# Patient Record
Sex: FEMALE | Race: WHITE | Employment: UNEMPLOYED | URBAN - METROPOLITAN AREA
[De-identification: names, ages, dates, MRNs, and addresses within clinical notes are randomized per-mention and may not be internally consistent; named-entity substitution may affect disease eponyms.]

---

## 2019-11-12 PROBLEM — R10.2 PELVIC PAIN: Status: ACTIVE | Noted: 2019-11-12

## 2019-11-12 PROBLEM — Z87.42 HISTORY OF ENDOMETRIOSIS: Status: ACTIVE | Noted: 2019-11-12

## 2019-11-12 PROBLEM — K57.30 SIGMOID DIVERTICULOSIS: Status: ACTIVE | Noted: 2019-11-12

## 2019-11-18 PROBLEM — F11.11 HISTORY OF OPIOID ABUSE (HCC): Status: ACTIVE | Noted: 2019-11-18

## 2019-11-18 PROBLEM — F43.10 PTSD (POST-TRAUMATIC STRESS DISORDER): Status: ACTIVE | Noted: 2019-11-18

## 2019-11-18 PROBLEM — F41.9 ANXIETY: Status: ACTIVE | Noted: 2019-11-18

## 2019-11-18 PROBLEM — N93.9 ABNORMAL UTERINE BLEEDING: Status: ACTIVE | Noted: 2019-11-18

## 2019-11-18 PROBLEM — R87.619 ATYPICAL GLANDULAR CELLS OF UNDETERMINED SIGNIFICANCE (AGUS) ON CERVICAL PAP SMEAR: Status: ACTIVE | Noted: 2019-11-18

## 2019-11-18 PROBLEM — N88.2 CERVICAL STENOSIS (UTERINE CERVIX): Status: ACTIVE | Noted: 2019-11-18

## 2019-12-11 ENCOUNTER — HOSPITAL ENCOUNTER (OUTPATIENT)
Dept: SURGERY | Age: 39
Discharge: HOME | End: 2019-12-11

## 2019-12-11 VITALS — HEIGHT: 66 IN | WEIGHT: 105 LBS | BODY MASS INDEX: 16.88 KG/M2

## 2019-12-11 RX ORDER — PANTOPRAZOLE SODIUM 40 MG/1
40 TABLET, DELAYED RELEASE ORAL DAILY
COMMUNITY

## 2019-12-11 RX ORDER — ONDANSETRON 4 MG/1
4 TABLET, FILM COATED ORAL
COMMUNITY

## 2019-12-11 NOTE — PERIOP NOTES
Patient verified name and . Order for consent NOT found in EHR, unable to confirm case posting; patient verifies procedure. Type 2 surgery, PAT phone assessment complete. Orders NOT received. Labs per surgeon: No orders received. Labs per anesthesia protocol: Hgb DOS; order signed and held in EHR. Patient reports she is unable to come to the hospital prior to surgery to have lab worked; patient does not have a vehicle and mother works. Patient answered medical/surgical history questions at their best of ability. All prior to admission medications documented in [facility]. Patient instructed to take the following medications the day of surgery according to anesthesia guidelines with a small sip of water: Klonopin and Protonix. Hold all vitamins, supplements, herbals (including CBD oil and Marijuana) 7 days prior to surgery and NSAIDS 5 days prior to surgery. Patient instructed to stop Marijuana prior to surgery patient states \"It is better than any other fuc#### drug, pardon my language. \" Patient instructed importance of not smoking marijuana prior to surgery per anesthesia protocol, patient states \"I've smoked before I've gone under before with no problems, but whatever, I'll listen to the doctors. \"       Patient instructed on the following:  Arrive at [address], time of arrival to be called the day before by 1700  NPO after midnight including gum, mints, and ice chips  Responsible adult must drive patient to the hospital, stay during surgery, and patient will need supervision 24 hours after anesthesia  Use anti-bacterial soap in shower the night before surgery and on the morning of surgery  All piercings must be removed prior to arrival.    Leave all valuables (money and jewelry) at home but bring insurance card and ID on       DOS. Do not wear make-up, nail polish, lotions, cologne, perfumes, powders, or oil on skin.     Patient teach back successful and patient demonstrates knowledge of instruction.

## 2019-12-14 ENCOUNTER — ANESTHESIA EVENT (OUTPATIENT)
Dept: SURGERY | Age: 39
End: 2019-12-14
Payer: MEDICAID

## 2019-12-16 ENCOUNTER — HOSPITAL ENCOUNTER (OUTPATIENT)
Age: 39
Setting detail: OUTPATIENT SURGERY
Discharge: HOME | End: 2019-12-16
Attending: OBSTETRICS & GYNECOLOGY | Admitting: OBSTETRICS & GYNECOLOGY
Payer: MEDICAID

## 2019-12-16 ENCOUNTER — ANESTHESIA (OUTPATIENT)
Dept: SURGERY | Age: 39
End: 2019-12-16
Payer: MEDICAID

## 2019-12-16 VITALS
HEIGHT: 66 IN | SYSTOLIC BLOOD PRESSURE: 109 MMHG | WEIGHT: 103 LBS | OXYGEN SATURATION: 96 % | DIASTOLIC BLOOD PRESSURE: 62 MMHG | TEMPERATURE: 97.5 F | BODY MASS INDEX: 16.55 KG/M2 | HEART RATE: 48 BPM | RESPIRATION RATE: 12 BRPM

## 2019-12-16 DIAGNOSIS — G89.18 POST-OP PAIN: Primary | ICD-10-CM

## 2019-12-16 PROBLEM — N73.6 PELVIC ADHESIONS: Status: ACTIVE | Noted: 2019-12-16

## 2019-12-16 LAB
HCG UR QL: NEGATIVE
HGB BLD-MCNC: 12.5 G/DL (ref 11.7–15.4)

## 2019-12-16 PROCEDURE — 77030031139 HC SUT VCRL2 J&J -A: Performed by: OBSTETRICS & GYNECOLOGY

## 2019-12-16 PROCEDURE — 76210000026 HC REC RM PH II 1 TO 1.5 HR: Performed by: OBSTETRICS & GYNECOLOGY

## 2019-12-16 PROCEDURE — 74011250636 HC RX REV CODE- 250/636: Performed by: NURSE ANESTHETIST, CERTIFIED REGISTERED

## 2019-12-16 PROCEDURE — 74011250636 HC RX REV CODE- 250/636: Performed by: ANESTHESIOLOGY

## 2019-12-16 PROCEDURE — 74011000250 HC RX REV CODE- 250: Performed by: NURSE ANESTHETIST, CERTIFIED REGISTERED

## 2019-12-16 PROCEDURE — 77030039425 HC BLD LARYNG TRULITE DISP TELE -A: Performed by: NURSE ANESTHETIST, CERTIFIED REGISTERED

## 2019-12-16 PROCEDURE — 77030020829: Performed by: OBSTETRICS & GYNECOLOGY

## 2019-12-16 PROCEDURE — 76060000034 HC ANESTHESIA 1.5 TO 2 HR: Performed by: OBSTETRICS & GYNECOLOGY

## 2019-12-16 PROCEDURE — 88305 TISSUE EXAM BY PATHOLOGIST: CPT

## 2019-12-16 PROCEDURE — 76010000161 HC OR TIME 1 TO 1.5 HR INTENSV-TIER 1: Performed by: OBSTETRICS & GYNECOLOGY

## 2019-12-16 PROCEDURE — 81025 URINE PREGNANCY TEST: CPT

## 2019-12-16 PROCEDURE — 77030040922 HC BLNKT HYPOTHRM STRY -A: Performed by: NURSE ANESTHETIST, CERTIFIED REGISTERED

## 2019-12-16 PROCEDURE — 85018 HEMOGLOBIN: CPT

## 2019-12-16 PROCEDURE — 74011250637 HC RX REV CODE- 250/637: Performed by: OBSTETRICS & GYNECOLOGY

## 2019-12-16 PROCEDURE — 74011250637 HC RX REV CODE- 250/637: Performed by: ANESTHESIOLOGY

## 2019-12-16 PROCEDURE — 77030019927 HC TBNG IRR CYSTO BAXT -A: Performed by: OBSTETRICS & GYNECOLOGY

## 2019-12-16 PROCEDURE — 76210000006 HC OR PH I REC 0.5 TO 1 HR: Performed by: OBSTETRICS & GYNECOLOGY

## 2019-12-16 PROCEDURE — 77030012770 HC TRCR OPT FX AMR -B: Performed by: OBSTETRICS & GYNECOLOGY

## 2019-12-16 PROCEDURE — 77030040361 HC SLV COMPR DVT MDII -B: Performed by: OBSTETRICS & GYNECOLOGY

## 2019-12-16 PROCEDURE — 77030018836 HC SOL IRR NACL ICUM -A: Performed by: OBSTETRICS & GYNECOLOGY

## 2019-12-16 PROCEDURE — 77030003578 HC NDL INSUF VERES AMR -B: Performed by: OBSTETRICS & GYNECOLOGY

## 2019-12-16 PROCEDURE — 77030037088 HC TUBE ENDOTRACH ORAL NSL COVD-A: Performed by: NURSE ANESTHETIST, CERTIFIED REGISTERED

## 2019-12-16 PROCEDURE — 77030008606 HC TRCR ENDOSC KII AMR -B: Performed by: OBSTETRICS & GYNECOLOGY

## 2019-12-16 PROCEDURE — 77030040830 HC CATH URETH FOL MDII -A: Performed by: OBSTETRICS & GYNECOLOGY

## 2019-12-16 PROCEDURE — 77030034696 HC CATH URETH FOL 2W BARD -A: Performed by: OBSTETRICS & GYNECOLOGY

## 2019-12-16 PROCEDURE — 77030018825 HC SOL ADH REDUC BAXT -C: Performed by: OBSTETRICS & GYNECOLOGY

## 2019-12-16 PROCEDURE — 77030008522 HC TBNG INSUF LAPRO STRY -B: Performed by: OBSTETRICS & GYNECOLOGY

## 2019-12-16 PROCEDURE — 77030000038 HC TIP SCIS LAPSCP SURI -B: Performed by: OBSTETRICS & GYNECOLOGY

## 2019-12-16 RX ORDER — DEXAMETHASONE SODIUM PHOSPHATE 4 MG/ML
INJECTION, SOLUTION INTRA-ARTICULAR; INTRALESIONAL; INTRAMUSCULAR; INTRAVENOUS; SOFT TISSUE AS NEEDED
Status: DISCONTINUED | OUTPATIENT
Start: 2019-12-16 | End: 2019-12-16 | Stop reason: HOSPADM

## 2019-12-16 RX ORDER — NEOSTIGMINE METHYLSULFATE 1 MG/ML
INJECTION, SOLUTION INTRAVENOUS AS NEEDED
Status: DISCONTINUED | OUTPATIENT
Start: 2019-12-16 | End: 2019-12-16 | Stop reason: HOSPADM

## 2019-12-16 RX ORDER — SODIUM CHLORIDE, SODIUM LACTATE, POTASSIUM CHLORIDE, CALCIUM CHLORIDE 600; 310; 30; 20 MG/100ML; MG/100ML; MG/100ML; MG/100ML
75 INJECTION, SOLUTION INTRAVENOUS CONTINUOUS
Status: DISCONTINUED | OUTPATIENT
Start: 2019-12-16 | End: 2019-12-16 | Stop reason: HOSPADM

## 2019-12-16 RX ORDER — LIDOCAINE HYDROCHLORIDE 10 MG/ML
0.1 INJECTION INFILTRATION; PERINEURAL AS NEEDED
Status: DISCONTINUED | OUTPATIENT
Start: 2019-12-16 | End: 2019-12-16 | Stop reason: HOSPADM

## 2019-12-16 RX ORDER — SODIUM CHLORIDE 0.9 % (FLUSH) 0.9 %
5-40 SYRINGE (ML) INJECTION EVERY 8 HOURS
Status: DISCONTINUED | OUTPATIENT
Start: 2019-12-16 | End: 2019-12-16 | Stop reason: HOSPADM

## 2019-12-16 RX ORDER — HYDROMORPHONE HYDROCHLORIDE 2 MG/ML
0.5 INJECTION, SOLUTION INTRAMUSCULAR; INTRAVENOUS; SUBCUTANEOUS
Status: DISCONTINUED | OUTPATIENT
Start: 2019-12-16 | End: 2019-12-16 | Stop reason: HOSPADM

## 2019-12-16 RX ORDER — GABAPENTIN 300 MG/1
300 CAPSULE ORAL ONCE
Status: COMPLETED | OUTPATIENT
Start: 2019-12-16 | End: 2019-12-16

## 2019-12-16 RX ORDER — PROPOFOL 10 MG/ML
INJECTION, EMULSION INTRAVENOUS AS NEEDED
Status: DISCONTINUED | OUTPATIENT
Start: 2019-12-16 | End: 2019-12-16 | Stop reason: HOSPADM

## 2019-12-16 RX ORDER — ROCURONIUM BROMIDE 10 MG/ML
INJECTION, SOLUTION INTRAVENOUS AS NEEDED
Status: DISCONTINUED | OUTPATIENT
Start: 2019-12-16 | End: 2019-12-16 | Stop reason: HOSPADM

## 2019-12-16 RX ORDER — FENTANYL CITRATE 50 UG/ML
INJECTION, SOLUTION INTRAMUSCULAR; INTRAVENOUS AS NEEDED
Status: DISCONTINUED | OUTPATIENT
Start: 2019-12-16 | End: 2019-12-16 | Stop reason: HOSPADM

## 2019-12-16 RX ORDER — LIDOCAINE HYDROCHLORIDE 20 MG/ML
INJECTION, SOLUTION EPIDURAL; INFILTRATION; INTRACAUDAL; PERINEURAL AS NEEDED
Status: DISCONTINUED | OUTPATIENT
Start: 2019-12-16 | End: 2019-12-16 | Stop reason: HOSPADM

## 2019-12-16 RX ORDER — GLYCOPYRROLATE 0.2 MG/ML
INJECTION INTRAMUSCULAR; INTRAVENOUS AS NEEDED
Status: DISCONTINUED | OUTPATIENT
Start: 2019-12-16 | End: 2019-12-16 | Stop reason: HOSPADM

## 2019-12-16 RX ORDER — KETOROLAC TROMETHAMINE 10 MG/1
10 TABLET, FILM COATED ORAL
Qty: 20 TAB | Refills: 0 | Status: SHIPPED | OUTPATIENT
Start: 2019-12-16 | End: 2019-12-21

## 2019-12-16 RX ORDER — DIPHENHYDRAMINE HYDROCHLORIDE 50 MG/ML
12.5 INJECTION, SOLUTION INTRAMUSCULAR; INTRAVENOUS
Status: DISCONTINUED | OUTPATIENT
Start: 2019-12-16 | End: 2019-12-16 | Stop reason: HOSPADM

## 2019-12-16 RX ORDER — OXYCODONE HYDROCHLORIDE 5 MG/1
10 TABLET ORAL
Status: COMPLETED | OUTPATIENT
Start: 2019-12-16 | End: 2019-12-16

## 2019-12-16 RX ORDER — OXYCODONE HYDROCHLORIDE 5 MG/1
5 TABLET ORAL
Status: DISCONTINUED | OUTPATIENT
Start: 2019-12-16 | End: 2019-12-16 | Stop reason: HOSPADM

## 2019-12-16 RX ORDER — MIDAZOLAM HYDROCHLORIDE 1 MG/ML
2 INJECTION, SOLUTION INTRAMUSCULAR; INTRAVENOUS
Status: COMPLETED | OUTPATIENT
Start: 2019-12-16 | End: 2019-12-16

## 2019-12-16 RX ORDER — EPHEDRINE SULFATE/0.9% NACL/PF 50 MG/5 ML
SYRINGE (ML) INTRAVENOUS AS NEEDED
Status: DISCONTINUED | OUTPATIENT
Start: 2019-12-16 | End: 2019-12-16 | Stop reason: HOSPADM

## 2019-12-16 RX ORDER — ONDANSETRON 2 MG/ML
INJECTION INTRAMUSCULAR; INTRAVENOUS AS NEEDED
Status: DISCONTINUED | OUTPATIENT
Start: 2019-12-16 | End: 2019-12-16 | Stop reason: HOSPADM

## 2019-12-16 RX ORDER — SODIUM CHLORIDE 0.9 % (FLUSH) 0.9 %
5-40 SYRINGE (ML) INJECTION AS NEEDED
Status: DISCONTINUED | OUTPATIENT
Start: 2019-12-16 | End: 2019-12-16 | Stop reason: HOSPADM

## 2019-12-16 RX ORDER — NALOXONE HYDROCHLORIDE 0.4 MG/ML
0.1 INJECTION, SOLUTION INTRAMUSCULAR; INTRAVENOUS; SUBCUTANEOUS
Status: DISCONTINUED | OUTPATIENT
Start: 2019-12-16 | End: 2019-12-16 | Stop reason: HOSPADM

## 2019-12-16 RX ORDER — FLUMAZENIL 0.1 MG/ML
0.2 INJECTION INTRAVENOUS AS NEEDED
Status: DISCONTINUED | OUTPATIENT
Start: 2019-12-16 | End: 2019-12-16 | Stop reason: HOSPADM

## 2019-12-16 RX ADMIN — PROPOFOL 150 MG: 10 INJECTION, EMULSION INTRAVENOUS at 11:50

## 2019-12-16 RX ADMIN — Medication 3 MG: at 12:58

## 2019-12-16 RX ADMIN — GABAPENTIN 300 MG: 300 CAPSULE ORAL at 09:36

## 2019-12-16 RX ADMIN — FENTANYL CITRATE 100 MCG: 50 INJECTION INTRAMUSCULAR; INTRAVENOUS at 11:50

## 2019-12-16 RX ADMIN — DEXAMETHASONE SODIUM PHOSPHATE 4 MG: 4 INJECTION, SOLUTION INTRAMUSCULAR; INTRAVENOUS at 12:03

## 2019-12-16 RX ADMIN — SODIUM CHLORIDE, SODIUM LACTATE, POTASSIUM CHLORIDE, AND CALCIUM CHLORIDE 75 ML/HR: 600; 310; 30; 20 INJECTION, SOLUTION INTRAVENOUS at 09:20

## 2019-12-16 RX ADMIN — GLYCOPYRROLATE 0.4 MG: 0.2 INJECTION, SOLUTION INTRAMUSCULAR; INTRAVENOUS at 12:58

## 2019-12-16 RX ADMIN — HYDROMORPHONE HYDROCHLORIDE 0.5 MG: 2 INJECTION INTRAMUSCULAR; INTRAVENOUS; SUBCUTANEOUS at 13:36

## 2019-12-16 RX ADMIN — ROCURONIUM BROMIDE 20 MG: 10 INJECTION, SOLUTION INTRAVENOUS at 11:50

## 2019-12-16 RX ADMIN — Medication 20 MG: at 11:59

## 2019-12-16 RX ADMIN — LIDOCAINE HYDROCHLORIDE 50 MG: 20 INJECTION, SOLUTION EPIDURAL; INFILTRATION; INTRACAUDAL; PERINEURAL at 11:50

## 2019-12-16 RX ADMIN — MIDAZOLAM 2 MG: 1 INJECTION INTRAMUSCULAR; INTRAVENOUS at 09:37

## 2019-12-16 RX ADMIN — HYDROMORPHONE HYDROCHLORIDE 0.5 MG: 2 INJECTION INTRAMUSCULAR; INTRAVENOUS; SUBCUTANEOUS at 13:46

## 2019-12-16 RX ADMIN — ROCURONIUM BROMIDE 10 MG: 10 INJECTION, SOLUTION INTRAVENOUS at 12:07

## 2019-12-16 RX ADMIN — ONDANSETRON 4 MG: 2 INJECTION INTRAMUSCULAR; INTRAVENOUS at 12:03

## 2019-12-16 RX ADMIN — Medication 1 MG: at 13:01

## 2019-12-16 RX ADMIN — SODIUM CHLORIDE, SODIUM LACTATE, POTASSIUM CHLORIDE, AND CALCIUM CHLORIDE: 600; 310; 30; 20 INJECTION, SOLUTION INTRAVENOUS at 12:47

## 2019-12-16 RX ADMIN — ROCURONIUM BROMIDE 10 MG: 10 INJECTION, SOLUTION INTRAVENOUS at 12:19

## 2019-12-16 RX ADMIN — OXYCODONE 10 MG: 5 TABLET ORAL at 14:20

## 2019-12-16 RX ADMIN — Medication 10 MG: at 11:56

## 2019-12-16 RX ADMIN — HYDROMORPHONE HYDROCHLORIDE 0.5 MG: 2 INJECTION INTRAMUSCULAR; INTRAVENOUS; SUBCUTANEOUS at 13:41

## 2019-12-16 RX ADMIN — GLYCOPYRROLATE 0.2 MG: 0.2 INJECTION, SOLUTION INTRAMUSCULAR; INTRAVENOUS at 13:01

## 2019-12-16 NOTE — DISCHARGE INSTRUCTIONS
Patient Education        Dilation and Curettage: What to Expect at Home  Your Recovery  Dilation and curettage (D&C) is a procedure to remove tissue from the inside of the uterus. The doctor used a curved tool, called a curette, to gently scrape tissue from your uterus. You are likely to have a backache, or cramps similar to menstrual cramps, and pass small clots of blood from your vagina for the first few days. You may continue to have light vaginal bleeding for several weeks after the procedure. You will probably be able to go back to most of your normal activities in 1 or 2 days. This care sheet gives you a general idea about how long it will take for you to recover. But each person recovers at a different pace. Follow the steps below to get better as quickly as possible. How can you care for yourself at home? Activity    · Rest when you feel tired. Getting enough sleep will help you recover.     · Avoid strenuous activities, such as bicycle riding, jogging, weight lifting, or aerobic exercise, until your doctor says it is okay.     · Most women are able to return to work the day after the procedure.     · You may have some light vaginal bleeding. Wear sanitary pads if needed. Do not douche or use tampons for 2 weeks or until your doctor says it is okay.     · Ask your doctor when it is okay for you to have sex.     · If you could become pregnant, talk about birth control with your doctor. Do not try to become pregnant until your doctor says it is okay. Diet    · You can eat your normal diet. If your stomach is upset, try bland, low-fat foods like plain rice, broiled chicken, toast, and yogurt.     · Drink plenty of fluids (unless your doctor tells you not to). Medicines    · Your doctor will tell you if and when you can restart your medicines.  He or she will also give you instructions about taking any new medicines.     · If you take blood thinners, such as warfarin (Coumadin), clopidogrel (Plavix), or aspirin, be sure to talk to your doctor. He or she will tell you if and when to start taking those medicines again. Make sure that you understand exactly what your doctor wants you to do.     · Be safe with medicines. Take pain medicines exactly as directed. ? If the doctor gave you a prescription medicine for pain, take it as prescribed. ? If you are not taking a prescription pain medicine, ask your doctor if you can take an over-the-counter medicine.     · If you think your pain medicine is making you sick to your stomach:  ? Take your medicine after meals (unless your doctor has told you not to). ? Ask your doctor for a different pain medicine.     · If your doctor prescribed antibiotics, take them as directed. Do not stop taking them just because you feel better. You need to take the full course of antibiotics. Follow-up care is a key part of your treatment and safety. Be sure to make and go to all appointments, and call your doctor if you are having problems. It's also a good idea to know your test results and keep a list of the medicines you take. When should you call for help? Call 911 anytime you think you may need emergency care. For example, call if:    · You passed out (lost consciousness).     · You have chest pain, are short of breath, or cough up blood.    Call your doctor now or seek immediate medical care if:    · You have bright red vaginal bleeding that soaks one or more pads in an hour, or you have large clots.     · You have vaginal discharge that increases in amount or smells bad.     · You are sick to your stomach or cannot drink fluids.     · You have pain that does not get better after you take pain medicine.     · You cannot pass stools or gas.     · You have symptoms of a blood clot in your leg (called a deep vein thrombosis), such as:  ? Pain in your calf, back of the knee, thigh, or groin. ?  Redness and swelling in your leg.     · You have signs of infection, such as:  ? Increased pain, swelling, warmth, or redness. ? Red streaks leading from the area. ? Pus draining from the area. ? A fever.    Watch closely for changes in your health, and be sure to contact your doctor if you have any problems. Where can you learn more? Go to http://jose miguel-roxy.info/. Enter [phone] in the search box to learn more about \"Dilation and Curettage: What to Expect at Home. \"  Current as of: May 29, 2019  Content Version: 12.2  © 6738-1095 [employer]. Care instructions adapted under license by [employer] (which disclaims liability or warranty for this information). If you have questions about a medical condition or this instruction, always ask your healthcare professional. [address] any warranty or liability for your use of this information.

## 2019-12-16 NOTE — ANESTHESIA POSTPROCEDURE EVALUATION
Procedure(s):  DILATATION AND CURETTAGE HYSTEROSCOPY  COLPOSCOPY  LAPAROSCOPY GYN /LASER. general    Anesthesia Post Evaluation      Multimodal analgesia: multimodal analgesia used between 6 hours prior to anesthesia start to PACU discharge  Patient location during evaluation: PACU  Patient participation: complete - patient participated  Level of consciousness: awake  Pain management: adequate  Airway patency: patent  Anesthetic complications: no  Cardiovascular status: acceptable and hemodynamically stable  Respiratory status: acceptable  Hydration status: acceptable  Comments: Acceptable for discharge from PACU.   Post anesthesia nausea and vomiting:  none      Vitals Value Taken Time   /58 12/16/2019  1:37 PM   Temp 36.6 °C (97.8 °F) 12/16/2019  1:17 PM   Pulse 44 12/16/2019  1:37 PM   Resp 12 12/16/2019  1:37 PM   SpO2 100 % 12/16/2019  1:37 PM

## 2019-12-16 NOTE — OP NOTES
LASER LAPARSCOPY/HYSTEROSCOPY/DILATATION AND CURRETAGE    DATE OF PROCEDURE: 12/16/2019    PREOPERATIVE DIAGNOSIS  Pelvic pain in female [R10.2]  History ofEndometriosis [N80.9] MEDHAT    POSTOPERATIVE DIAGNOSI MELISSA  Pelvic adhesions    PROCEDURE: Laser Laparoscopy/Hysteroscopy fractional   D&C/Coploscopy      SURGEON:  HAYLEE Morris: Kendy Gonzales CST    ANESTHESIA:  General Endotracheal Anesthesia    EBL:  Minimal    FINDINGS: Ovaries bilaterally adherent to pelvic sidewall. Left adnexa adherent to sigmoid colon and to the pericolic fat    SPECIMEN: ECC, EMC    PROCEDURE:  After thorough counseling and consent, the patient was taken to the operating room where she was placed in the supine position and underwent general endotracheal anesthesia without incident. She was placed in the low lithotomy position using the Javier stirrups. She was prepped with Betadine solution for laparoscopic and vaginal surgery and was draped in the usual sterile fashion. A Welch catheter was placed transurethrally and attached to a drainage bag. A speculum was placed in the vagina; the cervix was visualized and grasped with a single-tooth tennaculum. A Blood cannula uterine manipulator was placed transcervically and attached to the single tooth tennaculum. Attention was then turned to the abdomen an incision was made in the umbilicus and a verses needle was inserted in the abdomen; good placement was verified using the hanging drop technique. The abdomen was filled with 2 ½ liters of Co2. A 10mm trocar was placed in the abdomen, an operative laparoscope was placed through the trocar and the intra-abdominal contents were visualized. Above findings. For laparoscopic scissors and CO2 laser were used to restore normal anatomy on both sides of the pelvis. Ureters were bilaterally identified. 802,000 cc of adept was used to flood the pelvis after hemostasis was ensured.   The trocars were removed and the incisions were closed with 4-0 Vicryl stitch. .  Attention was then directed to the vaginal portion of the case. The cervix was exposed with a heavy weighted vaginal speculum, Blood cannula was removed from the cervix. Colposcopy was not satisfactory due to the cervical stenosis following previous LEEP procedures. The ectocervix was treated with acetic acid and appeared colposcopically normal.No cervical biopsies were obtained. A thorough endocervical curetting was obtained with the [facility] curette the uterus sounded to 7cm and dilated to [address]. Diagnostic Hysteroscope was introduced into the endometrial cavity using saline as a distention median. .  Contents of the uterine cavity were visualized. A thorough curettage of the endometrium was performed associated the use of a Darius-Stone Forceps. Reinspection of the uterine cavity with the Hysteroscope was accomplished without problems. The endometrial cavity appeared normal following the curettage. Both Endometrial and Endocervical curretings will be sent for histological review. The Hysteroscope was removed. The single-tooth tennaculum was removed from the cervix. Hemostasis was assured. The patient was awakened from [address] she tolerated the procedure well and was transferred to the recovery room in stable condition.

## 2019-12-16 NOTE — PERIOP NOTES
Discharge instructions completed 30 minutes ago. Pt's mother does not wish to get her dressed, yet. Pt to discharge area after report given to Rosina Munson RN.

## 2019-12-16 NOTE — ANESTHESIA PREPROCEDURE EVALUATION
Relevant Problems   No relevant active problems       Anesthetic History   No history of anesthetic complications            Review of Systems / Medical History  Patient summary reviewed and pertinent labs reviewed    Pulmonary          Smoker         Neuro/Psych         Psychiatric history    Comments: Regular THC use Cardiovascular  Within defined limits                Exercise tolerance: >4 METS     GI/Hepatic/Renal     GERD: well controlled           Endo/Other  Within defined limits           Other Findings              Physical Exam    Airway  Mallampati: I  TM Distance: 4 - 6 cm  Neck ROM: normal range of motion   Mouth opening: Normal     Cardiovascular    Rhythm: regular  Rate: normal         Dental    Dentition: Bridges     Pulmonary  Breath sounds clear to auscultation               Abdominal         Other Findings            Anesthetic Plan    ASA: 2  Anesthesia type: general            Anesthetic plan and risks discussed with: Patient and Mother

## 2019-12-30 PROBLEM — N83.8 DIMINISHED OVARIAN RESERVE: Status: ACTIVE | Noted: 2019-12-30

## 2020-02-10 ENCOUNTER — TRANSCRIPTION ENCOUNTER (OUTPATIENT)
Age: 40
End: 2020-02-10

## 2020-12-02 ENCOUNTER — APPOINTMENT (OUTPATIENT)
Dept: CARDIOLOGY | Facility: CLINIC | Age: 40
End: 2020-12-02
Payer: MEDICAID

## 2020-12-02 ENCOUNTER — NON-APPOINTMENT (OUTPATIENT)
Age: 40
End: 2020-12-02

## 2020-12-02 VITALS
TEMPERATURE: 97.8 F | BODY MASS INDEX: 20.09 KG/M2 | DIASTOLIC BLOOD PRESSURE: 64 MMHG | RESPIRATION RATE: 16 BRPM | SYSTOLIC BLOOD PRESSURE: 110 MMHG | HEIGHT: 66 IN | HEART RATE: 77 BPM | OXYGEN SATURATION: 99 % | WEIGHT: 125 LBS

## 2020-12-02 DIAGNOSIS — Z86.79 PERSONAL HISTORY OF OTHER DISEASES OF THE CIRCULATORY SYSTEM: ICD-10-CM

## 2020-12-02 DIAGNOSIS — Z83.3 FAMILY HISTORY OF DIABETES MELLITUS: ICD-10-CM

## 2020-12-02 DIAGNOSIS — F41.9 ANXIETY DISORDER, UNSPECIFIED: ICD-10-CM

## 2020-12-02 DIAGNOSIS — Z82.49 FAMILY HISTORY OF ISCHEMIC HEART DISEASE AND OTHER DISEASES OF THE CIRCULATORY SYSTEM: ICD-10-CM

## 2020-12-02 PROCEDURE — 99072 ADDL SUPL MATRL&STAF TM PHE: CPT

## 2020-12-02 PROCEDURE — 99204 OFFICE O/P NEW MOD 45 MIN: CPT

## 2020-12-02 RX ORDER — QUETIAPINE FUMARATE 100 MG/1
100 TABLET ORAL
Refills: 0 | Status: ACTIVE | COMMUNITY

## 2020-12-02 RX ORDER — CLONAZEPAM 1 MG/1
1 TABLET ORAL 3 TIMES DAILY
Refills: 0 | Status: ACTIVE | COMMUNITY

## 2020-12-02 NOTE — REASON FOR VISIT
[FreeTextEntry1] : Roma is a 40-year-old female with no past cardiovascular history.  She does have a strong family history of premature CAD several members on her father's side including her own father who had his first major cardiovascular event in his 50s.\par \par Patient is very athletic without any cardiac symptoms.  She denies palpitations, chest pain, shortness of breath, dizziness, syncope.\par \par She is trying to get pregnant being followed by GYN /fertility clinic\par \par Recently, her EKG showed abnormal T waves in aVL and nonspecific in V3.  She was then referred for further cardiovascular evaluation

## 2020-12-02 NOTE — PHYSICAL EXAM
[General Appearance - Well Developed] : well developed [Normal Appearance] : normal appearance [Well Groomed] : well groomed [General Appearance - Well Nourished] : well nourished [No Deformities] : no deformities [General Appearance - In No Acute Distress] : no acute distress [Normal Conjunctiva] : the conjunctiva exhibited no abnormalities [Eyelids - No Xanthelasma] : the eyelids demonstrated no xanthelasmas [Normal Oral Mucosa] : normal oral mucosa [No Oral Pallor] : no oral pallor [No Oral Cyanosis] : no oral cyanosis [Heart Rate And Rhythm] : heart rate and rhythm were normal [Heart Sounds] : normal S1 and S2 [Edema] : no peripheral edema present [Respiration, Rhythm And Depth] : normal respiratory rhythm and effort [Exaggerated Use Of Accessory Muscles For Inspiration] : no accessory muscle use [Auscultation Breath Sounds / Voice Sounds] : lungs were clear to auscultation bilaterally [Abdomen Soft] : soft [Abdomen Tenderness] : non-tender [Abnormal Walk] : normal gait [Gait - Sufficient For Exercise Testing] : the gait was sufficient for exercise testing [Nail Clubbing] : no clubbing of the fingernails [Cyanosis, Localized] : no localized cyanosis [Skin Color & Pigmentation] : normal skin color and pigmentation [Skin Turgor] : normal skin turgor [] : no rash [Oriented To Time, Place, And Person] : oriented to person, place, and time [Affect] : the affect was normal [Mood] : the mood was normal [No Anxiety] : not feeling anxious [FreeTextEntry1] : ?short systolic murmur

## 2020-12-02 NOTE — ASSESSMENT
[FreeTextEntry1] : Reviewed today:\par -EKG 11/24/2020: Sinus rhythm.  Nonspecific T waves in aVL and V3.\par -EKG 12-2-20: NS rhythm.  Mildly inverted T waves in V2.  Mostly unchanged compared to previous EKG

## 2020-12-02 NOTE — DISCUSSION/SUMMARY
[FreeTextEntry1] : Nonspecific T wave abnormality in a young woman with no previous cardiovascular history and asymptomatic for cardiac disease.\par \par She does have a strong family history of premature CAD.  We will also need preprocedure cardiac evaluation prior to receiving ova for infertility\par \par She does have history of systolic murmur.  Echocardiogram and ETT should be performed.  I also requested fasting lipid profile from her gynecologist\par \par Evaluation of early atherosclerosis with CT calcium score would be appropriate.  The patient is sure that she cannot be pregnant.  She understands that radiation is involved in obtaining CT calcium score which could be very harmful to possible pregnancy.  When then, I have suggested that she have urinary pregnancy test prior to her CT calcium score\par \par Follow-up after above tests.\par \par

## 2020-12-08 ENCOUNTER — NON-APPOINTMENT (OUTPATIENT)
Age: 40
End: 2020-12-08

## 2020-12-10 ENCOUNTER — NON-APPOINTMENT (OUTPATIENT)
Age: 40
End: 2020-12-10

## 2020-12-18 ENCOUNTER — APPOINTMENT (OUTPATIENT)
Dept: CARDIOLOGY | Facility: CLINIC | Age: 40
End: 2020-12-18

## 2020-12-21 ENCOUNTER — NON-APPOINTMENT (OUTPATIENT)
Age: 40
End: 2020-12-21

## 2021-01-05 ENCOUNTER — APPOINTMENT (OUTPATIENT)
Dept: CARDIOLOGY | Facility: CLINIC | Age: 41
End: 2021-01-05
Payer: MEDICAID

## 2021-01-05 PROCEDURE — 99072 ADDL SUPL MATRL&STAF TM PHE: CPT

## 2021-01-05 PROCEDURE — 93306 TTE W/DOPPLER COMPLETE: CPT

## 2021-01-13 ENCOUNTER — APPOINTMENT (OUTPATIENT)
Dept: CARDIOLOGY | Facility: CLINIC | Age: 41
End: 2021-01-13

## 2021-01-19 ENCOUNTER — APPOINTMENT (OUTPATIENT)
Dept: CARDIOLOGY | Facility: CLINIC | Age: 41
End: 2021-01-19
Payer: MEDICAID

## 2021-01-19 ENCOUNTER — APPOINTMENT (OUTPATIENT)
Dept: CARDIOLOGY | Facility: CLINIC | Age: 41
End: 2021-01-19

## 2021-01-19 DIAGNOSIS — R94.31 ABNORMAL ELECTROCARDIOGRAM [ECG] [EKG]: ICD-10-CM

## 2021-01-19 DIAGNOSIS — F32.9 MAJOR DEPRESSIVE DISORDER, SINGLE EPISODE, UNSPECIFIED: ICD-10-CM

## 2021-01-19 PROCEDURE — 99442: CPT

## 2022-05-12 ENCOUNTER — APPOINTMENT (OUTPATIENT)
Dept: DERMATOLOGY | Facility: CLINIC | Age: 42
End: 2022-05-12
Payer: MEDICAID

## 2022-05-12 DIAGNOSIS — L82.1 OTHER SEBORRHEIC KERATOSIS: ICD-10-CM

## 2022-05-12 DIAGNOSIS — D48.5 NEOPLASM OF UNCERTAIN BEHAVIOR OF SKIN: ICD-10-CM

## 2022-05-12 PROCEDURE — 11102 TANGNTL BX SKIN SINGLE LES: CPT

## 2022-05-12 PROCEDURE — 99202 OFFICE O/P NEW SF 15 MIN: CPT | Mod: 25

## 2022-05-12 RX ORDER — ACETAMINOPHEN 325 MG
TABLET ORAL
Refills: 0 | Status: ACTIVE | COMMUNITY

## 2022-05-12 RX ORDER — SERTRALINE HYDROCHLORIDE 100 MG/1
100 TABLET, FILM COATED ORAL
Refills: 0 | Status: ACTIVE | COMMUNITY

## 2022-05-12 RX ORDER — ERGOCALCIFEROL 1.25 MG/1
1.25 MG CAPSULE, LIQUID FILLED ORAL
Refills: 0 | Status: ACTIVE | COMMUNITY

## 2022-05-12 RX ORDER — PANTOPRAZOLE SODIUM 40 MG/1
40 GRANULE, DELAYED RELEASE ORAL
Refills: 0 | Status: ACTIVE | COMMUNITY

## 2022-05-12 RX ORDER — UBIDECARENONE 200 MG
500 CAPSULE ORAL
Refills: 0 | Status: ACTIVE | COMMUNITY

## 2022-05-12 RX ORDER — MAGNESIUM OXIDE/MAG AA CHELATE 300 MG
CAPSULE ORAL
Refills: 0 | Status: ACTIVE | COMMUNITY

## 2022-05-12 RX ORDER — ELASTIC BANDAGE 2"X2.2YD
BANDAGE TOPICAL
Refills: 0 | Status: ACTIVE | COMMUNITY

## 2022-05-12 RX ORDER — TRAZODONE HYDROCHLORIDE 50 MG/1
50 TABLET ORAL
Refills: 0 | Status: ACTIVE | COMMUNITY

## 2022-05-12 NOTE — PHYSICAL EXAM
[Alert] : alert [Oriented x 3] : ~L oriented x 3 [Well Nourished] : well nourished [FreeTextEntry3] : The following areas were examined and no significant abnormalities were seen except as noted below:\par \par Type II skin\par \par scalp, face, eyelids, nose, lips, ears, neck, chest, abdomen, back, buttocks, right arm, left arm, right hand, left hand,\par right  leg, left leg, right foot, left foot\par Breast and groin exams offered and declined by patient.\par \par Mid inner bowl of left ear:  Circular depressed scar, focally white-especially [place]\par Right distal forearm: 4 x 3.5 mm pink papule\par Left mid forearm:  11 x 8 mm brown patch-darker and focally raised [place]-not suspicious on dermsocopy\par Left calf: 4 x 4 mm tan tan scaly slightly elevated papule and a tattoo\par Right lateral leg, 3 x 2 mm slightly eroded papule\par Left lower medial leg: 7 x 4 mm crusted pink plaque\par Right Achilles area noted by 10 mm thick brown verrucous plaque\par \par No other suspicious lesions seen

## 2022-05-12 NOTE — CONSULT LETTER
[Dear  ___] : Dear  [unfilled], [Consult Letter:] : I had the pleasure of evaluating your patient, [unfilled]. [Consult Closing:] : Thank you very much for allowing me to participate in the care of this patient.  If you have any questions, please do not hesitate to contact me. [Sincerely,] : Sincerely, [FreeTextEntry2] : Sukumar Evans, DO [FreeTextEntry1] : Examination of the skin reveals a 7 x 4 mm crusted erythematous plaque on the left lower medial leg. While this is probably a traumatized dermatofibroma, a biopsy was taken to rule out a potential basal cell carcinoma.\par \par The rest of her skin is unremarkable with scattered small seborrheic keratoses and warts, especially on the forearms and legs.\par The lesion on the left forearm is a benign lentigo with an early evolving seborrheic keratosis\par \par \par Please see attached chart note for further details. [FreeTextEntry3] : Kenroy Londono MD\par [address], [address]\par [place] [address]\par Tel ([phone])\par Fax ([phone])\par Private line ([phone])\par

## 2022-05-12 NOTE — HISTORY OF PRESENT ILLNESS
[FreeTextEntry1] : Evaluation of growths [de-identified] : First visit for 41-year-old white female referred by Sukumar Evans DO, for evaluation of growths.  Particular concerned about a lesions on the left forearm and left leg.  No history of skin cancer.

## 2022-05-12 NOTE — ASSESSMENT
[FreeTextEntry1] : Lentigo evolving into a seborrheic keratosis on left mid forearm\par Scattered early seborrheic keratoses on arms and legs\par Probable wart on right Achilles area\par ? Traumatized dermatofibroma on left lower medial leg-rule out BCC

## 2022-06-03 ENCOUNTER — NON-APPOINTMENT (OUTPATIENT)
Age: 42
End: 2022-06-03

## 2023-05-16 ENCOUNTER — INPATIENT (INPATIENT)
Facility: HOSPITAL | Age: 43
LOS: 6 days | Discharge: HOME | End: 2023-05-23
Attending: PSYCHIATRY & NEUROLOGY | Admitting: PSYCHIATRY & NEUROLOGY
Payer: MEDICAID

## 2023-05-16 VITALS
SYSTOLIC BLOOD PRESSURE: 126 MMHG | HEART RATE: 86 BPM | OXYGEN SATURATION: 100 % | TEMPERATURE: 98 F | RESPIRATION RATE: 18 BRPM | DIASTOLIC BLOOD PRESSURE: 87 MMHG

## 2023-05-16 DIAGNOSIS — Z90.79 ACQUIRED ABSENCE OF OTHER GENITAL ORGAN(S): Chronic | ICD-10-CM

## 2023-05-16 DIAGNOSIS — F32.9 MAJOR DEPRESSIVE DISORDER, SINGLE EPISODE, UNSPECIFIED: ICD-10-CM

## 2023-05-16 DIAGNOSIS — F12.20 CANNABIS DEPENDENCE, UNCOMPLICATED: ICD-10-CM

## 2023-05-16 DIAGNOSIS — F33.2 MAJOR DEPRESSIVE DISORDER, RECURRENT SEVERE WITHOUT PSYCHOTIC FEATURES: ICD-10-CM

## 2023-05-16 DIAGNOSIS — Z98.890 OTHER SPECIFIED POSTPROCEDURAL STATES: Chronic | ICD-10-CM

## 2023-05-16 DIAGNOSIS — F43.10 POST-TRAUMATIC STRESS DISORDER, UNSPECIFIED: ICD-10-CM

## 2023-05-16 LAB
ALBUMIN SERPL ELPH-MCNC: 4.4 G/DL — SIGNIFICANT CHANGE UP (ref 3.3–5)
ALP SERPL-CCNC: 70 U/L — SIGNIFICANT CHANGE UP (ref 40–120)
ALT FLD-CCNC: 7 U/L — SIGNIFICANT CHANGE UP (ref 4–33)
AMPHET UR-MCNC: NEGATIVE — SIGNIFICANT CHANGE UP
ANION GAP SERPL CALC-SCNC: 12 MMOL/L — SIGNIFICANT CHANGE UP (ref 7–14)
APAP SERPL-MCNC: <10 UG/ML — LOW (ref 15–25)
APPEARANCE UR: ABNORMAL
AST SERPL-CCNC: 13 U/L — SIGNIFICANT CHANGE UP (ref 4–32)
BACTERIA # UR AUTO: NEGATIVE — SIGNIFICANT CHANGE UP
BARBITURATES UR SCN-MCNC: NEGATIVE — SIGNIFICANT CHANGE UP
BASOPHILS # BLD AUTO: 0.06 K/UL — SIGNIFICANT CHANGE UP (ref 0–0.2)
BASOPHILS NFR BLD AUTO: 0.9 % — SIGNIFICANT CHANGE UP (ref 0–2)
BENZODIAZ UR-MCNC: POSITIVE
BILIRUB SERPL-MCNC: 0.2 MG/DL — SIGNIFICANT CHANGE UP (ref 0.2–1.2)
BILIRUB UR-MCNC: NEGATIVE — SIGNIFICANT CHANGE UP
BUN SERPL-MCNC: 15 MG/DL — SIGNIFICANT CHANGE UP (ref 7–23)
CALCIUM SERPL-MCNC: 9.1 MG/DL — SIGNIFICANT CHANGE UP (ref 8.4–10.5)
CHLORIDE SERPL-SCNC: 103 MMOL/L — SIGNIFICANT CHANGE UP (ref 98–107)
CO2 SERPL-SCNC: 26 MMOL/L — SIGNIFICANT CHANGE UP (ref 22–31)
COCAINE METAB.OTHER UR-MCNC: NEGATIVE — SIGNIFICANT CHANGE UP
COLOR SPEC: YELLOW — SIGNIFICANT CHANGE UP
CREAT SERPL-MCNC: 0.82 MG/DL — SIGNIFICANT CHANGE UP (ref 0.5–1.3)
CREATININE URINE RESULT, DAU: 115 MG/DL — SIGNIFICANT CHANGE UP
DIFF PNL FLD: NEGATIVE — SIGNIFICANT CHANGE UP
EGFR: 91 ML/MIN/1.73M2 — SIGNIFICANT CHANGE UP
EOSINOPHIL # BLD AUTO: 0.24 K/UL — SIGNIFICANT CHANGE UP (ref 0–0.5)
EOSINOPHIL NFR BLD AUTO: 3.7 % — SIGNIFICANT CHANGE UP (ref 0–6)
EPI CELLS # UR: 4 /HPF — SIGNIFICANT CHANGE UP (ref 0–5)
ETHANOL SERPL-MCNC: <10 MG/DL — SIGNIFICANT CHANGE UP
GLUCOSE SERPL-MCNC: 97 MG/DL — SIGNIFICANT CHANGE UP (ref 70–99)
GLUCOSE UR QL: NEGATIVE — SIGNIFICANT CHANGE UP
HCG SERPL-ACNC: <1 MIU/ML — SIGNIFICANT CHANGE UP
HCT VFR BLD CALC: 37.8 % — SIGNIFICANT CHANGE UP (ref 34.5–45)
HGB BLD-MCNC: 12.3 G/DL — SIGNIFICANT CHANGE UP (ref 11.5–15.5)
IANC: 4.33 K/UL — SIGNIFICANT CHANGE UP (ref 1.8–7.4)
IMM GRANULOCYTES NFR BLD AUTO: 0.3 % — SIGNIFICANT CHANGE UP (ref 0–0.9)
KETONES UR-MCNC: NEGATIVE — SIGNIFICANT CHANGE UP
LEUKOCYTE ESTERASE UR-ACNC: NEGATIVE — SIGNIFICANT CHANGE UP
LYMPHOCYTES # BLD AUTO: 1.44 K/UL — SIGNIFICANT CHANGE UP (ref 1–3.3)
LYMPHOCYTES # BLD AUTO: 22 % — SIGNIFICANT CHANGE UP (ref 13–44)
MCHC RBC-ENTMCNC: 30.2 PG — SIGNIFICANT CHANGE UP (ref 27–34)
MCHC RBC-ENTMCNC: 32.5 GM/DL — SIGNIFICANT CHANGE UP (ref 32–36)
MCV RBC AUTO: 92.9 FL — SIGNIFICANT CHANGE UP (ref 80–100)
METHADONE UR-MCNC: NEGATIVE — SIGNIFICANT CHANGE UP
MONOCYTES # BLD AUTO: 0.47 K/UL — SIGNIFICANT CHANGE UP (ref 0–0.9)
MONOCYTES NFR BLD AUTO: 7.2 % — SIGNIFICANT CHANGE UP (ref 2–14)
NEUTROPHILS # BLD AUTO: 4.33 K/UL — SIGNIFICANT CHANGE UP (ref 1.8–7.4)
NEUTROPHILS NFR BLD AUTO: 65.9 % — SIGNIFICANT CHANGE UP (ref 43–77)
NITRITE UR-MCNC: NEGATIVE — SIGNIFICANT CHANGE UP
NRBC # BLD: 0 /100 WBCS — SIGNIFICANT CHANGE UP (ref 0–0)
NRBC # FLD: 0 K/UL — SIGNIFICANT CHANGE UP (ref 0–0)
OPIATES UR-MCNC: NEGATIVE — SIGNIFICANT CHANGE UP
OXYCODONE UR-MCNC: POSITIVE
PCP SPEC-MCNC: SIGNIFICANT CHANGE UP
PCP UR-MCNC: NEGATIVE — SIGNIFICANT CHANGE UP
PH UR: 8.5 — HIGH (ref 5–8)
PLATELET # BLD AUTO: 274 K/UL — SIGNIFICANT CHANGE UP (ref 150–400)
POTASSIUM SERPL-MCNC: 3.8 MMOL/L — SIGNIFICANT CHANGE UP (ref 3.5–5.3)
POTASSIUM SERPL-SCNC: 3.8 MMOL/L — SIGNIFICANT CHANGE UP (ref 3.5–5.3)
PROT SERPL-MCNC: 6.7 G/DL — SIGNIFICANT CHANGE UP (ref 6–8.3)
PROT UR-MCNC: ABNORMAL
RBC # BLD: 4.07 M/UL — SIGNIFICANT CHANGE UP (ref 3.8–5.2)
RBC # FLD: 12.7 % — SIGNIFICANT CHANGE UP (ref 10.3–14.5)
RBC CASTS # UR COMP ASSIST: 4 /HPF — SIGNIFICANT CHANGE UP (ref 0–4)
SALICYLATES SERPL-MCNC: <0.3 MG/DL — LOW (ref 15–30)
SARS-COV-2 RNA SPEC QL NAA+PROBE: SIGNIFICANT CHANGE UP
SODIUM SERPL-SCNC: 141 MMOL/L — SIGNIFICANT CHANGE UP (ref 135–145)
SP GR SPEC: 1.02 — SIGNIFICANT CHANGE UP (ref 1.01–1.05)
THC UR QL: POSITIVE
UROBILINOGEN FLD QL: SIGNIFICANT CHANGE UP
WBC # BLD: 6.56 K/UL — SIGNIFICANT CHANGE UP (ref 3.8–10.5)
WBC # FLD AUTO: 6.56 K/UL — SIGNIFICANT CHANGE UP (ref 3.8–10.5)
WBC UR QL: 2 /HPF — SIGNIFICANT CHANGE UP (ref 0–5)

## 2023-05-16 PROCEDURE — 99285 EMERGENCY DEPT VISIT HI MDM: CPT

## 2023-05-16 RX ORDER — NICOTINE POLACRILEX 2 MG
1 GUM BUCCAL DAILY
Refills: 0 | Status: DISCONTINUED | OUTPATIENT
Start: 2023-05-16 | End: 2023-05-23

## 2023-05-16 RX ORDER — SERTRALINE 25 MG/1
200 TABLET, FILM COATED ORAL DAILY
Refills: 0 | Status: DISCONTINUED | OUTPATIENT
Start: 2023-05-16 | End: 2023-05-17

## 2023-05-16 RX ORDER — OXYCODONE HYDROCHLORIDE 5 MG/1
5 TABLET ORAL EVERY 6 HOURS
Refills: 0 | Status: DISCONTINUED | OUTPATIENT
Start: 2023-05-16 | End: 2023-05-19

## 2023-05-16 RX ORDER — ALBUTEROL 90 UG/1
2 AEROSOL, METERED ORAL EVERY 6 HOURS
Refills: 0 | Status: DISCONTINUED | OUTPATIENT
Start: 2023-05-16 | End: 2023-05-23

## 2023-05-16 RX ORDER — HYDROXYZINE HCL 10 MG
25 TABLET ORAL EVERY 6 HOURS
Refills: 0 | Status: DISCONTINUED | OUTPATIENT
Start: 2023-05-16 | End: 2023-05-23

## 2023-05-16 RX ORDER — NICOTINE POLACRILEX 2 MG
2 GUM BUCCAL
Refills: 0 | Status: DISCONTINUED | OUTPATIENT
Start: 2023-05-16 | End: 2023-05-23

## 2023-05-16 RX ADMIN — OXYCODONE HYDROCHLORIDE 5 MILLIGRAM(S): 5 TABLET ORAL at 21:07

## 2023-05-16 RX ADMIN — OXYCODONE HYDROCHLORIDE 5 MILLIGRAM(S): 5 TABLET ORAL at 22:30

## 2023-05-16 RX ADMIN — Medication 2 MILLIGRAM(S): at 21:07

## 2023-05-16 RX ADMIN — Medication 2 MILLIGRAM(S): at 14:37

## 2023-05-16 RX ADMIN — Medication 25 MILLIGRAM(S): at 21:07
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
## 2023-05-16 NOTE — ED BEHAVIORAL HEALTH ASSESSMENT NOTE - RISK ASSESSMENT
acute risks include partner's infidelity, surgery, depressive episode, suicidal ideation, access to weapons, substance use, insomnia, anxiety, trauma. chronic risks include prior psychiatric admissions, prior suicide attempt, death of loved ones. protective factors include adherence to treatment, supportive friend, help seeking. HIGH suicide risk which is mitigated with inpatient admission, medication management and milieu therapy.

## 2023-05-16 NOTE — BH PATIENT PROFILE - NSICDXPASTMEDICALHX_GEN_ALL_CORE_FT
PAST MEDICAL HISTORY:  Asthma     H/O endometritis     History of suicidal ideation     Major depression
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
## 2023-05-16 NOTE — ED PROVIDER NOTE - CLINICAL SUMMARY MEDICAL DECISION MAKING FREE TEXT BOX
pt here for SI with attempts. A+O x 3. appears anxious. mood appropriate. thoughts are linear. tremor on hands. ambulatory with steady gait.   will consult psych for SI.    ekg noted pt is bradycardic to 45. pt denies dizziness, chest pain. mental status same as on arrival. pt states that her hr is always low. bp 135/70. labs showed +benzo, oxycodone, THC. will continue monitor. pt put on CIWA.

## 2023-05-16 NOTE — ED ADULT TRIAGE NOTE - CHIEF COMPLAINT QUOTE
pt sent to ED by psychiatrist for SI with attempt to shot herself in her head.  pt also endorses walking in traffic.  denies HI.  pt s/p bilateral salpingectomy
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
## 2023-05-16 NOTE — ED BEHAVIORAL HEALTH ASSESSMENT NOTE - CURRENT MEDICATION
Zoloft 200mg daily. Xanax 2mg QHS. Oxycodone 5mg q6 PRN pain Zoloft 200mg daily. Xanax 2mg QHS. Oxycodone 5mg q6 PRN pain. ISTOP CHECKED, NO CONCERNING FILL PATTERNS.   05/13/2023--oxycodone hcl (ir) 5 mg tablet--#30--Rony Seay MD.  04/25/2023--oxycodone hcl (ir) 10 mg tab--#30--Rony Seay MD.  04/23/2023--alprazolam 1 mg tablet--#60--Martha Dennis.  04/20/2023--oxycodone-acetaminophen 5-325 mg tab--#5--Rony Seay MD.

## 2023-05-16 NOTE — ED BEHAVIORAL HEALTH ASSESSMENT NOTE - PSYCHIATRIC ISSUES AND PLAN (INCLUDE STANDING AND PRN MEDICATION)
continue zoloft 200mg daily, change xanax to ativan 2mg qhs for anxiety. Atarax 25mg q6 PRN anxiety. Ativan 2mg IM PRN agitation

## 2023-05-16 NOTE — ED BEHAVIORAL HEALTH NOTE - BEHAVIORAL HEALTH NOTE
COVID Exposure Screen- Patient    Have you been tested for COVID-19 in the last 90 days?  (  ) Yes  ( X ) No   (  ) Unknown- Reason: _____  IF YES: Date of test(s), type of test(s), result(s) for ALL tests in last 90 days: ________    In the past 10 days, have you been around anyone with a positive COVID-19 test? (  ) Yes  ( X ) No   (  ) Unknown- Reason: ____  IF YES: Were you closer than 6 feet of them for a total of 15 minutes or more in a 24 hour period? (  ) Yes   (  ) No   ( ) Unknown- Reason: _____

## 2023-05-16 NOTE — ED BEHAVIORAL HEALTH ASSESSMENT NOTE - NSBHMSEMOOD_PSY_A_CORE
Urgent Care Discharge Call Back    Person Contacted: patient    This is Neena Leroy RN calling as a courtesy from [facility].    Gina Moe PA-C wanted me to call you to see how you are doing.  You do not need to return this call, however, if you have any questions or concerns, please feel free to call us back at [phone].     Depressed

## 2023-05-16 NOTE — ED BEHAVIORAL HEALTH ASSESSMENT NOTE - OTHER PAST PSYCHIATRIC HISTORY (INCLUDE DETAILS REGARDING ONSET, COURSE OF ILLNESS, INPATIENT/OUTPATIENT TREATMENT)
3 prior psychiatric admissions - [place], [facility] and [place] in 2020. 2 prior suicide attempt via erratic driving and polysubstance overdose. current psychiatrist is Dr. aMrtha Dennis.

## 2023-05-16 NOTE — ED PROVIDER NOTE - PHYSICAL EXAMINATION
CONSTITUTIONAL: Well-appearing; well-nourished; in no apparent distress. Non-toxic appearing.   NEURO: Alert & oriented. Gait steady without assistance. Sensory and motor functions are grossly intact.  PSYCH: Mood appropriate. Thought processes intact.   NECK: Supple  CARD: Regular rate and rhythm, no murmurs  RESP: No accessory muscle use; breath sounds clear and equal bilaterally; no wheezes, rhonchi, or rales     ABD: Soft; non-distended; non-tender.   MUSCULOSKELETAL/EXTREMITIES: mild tremor on hands. FROM in all four extremities; no extremity edema.  SKIN: mild abrasion on wrists and hands. no deformity or laceration.
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
## 2023-05-16 NOTE — ED BEHAVIORAL HEALTH ASSESSMENT NOTE - ADDITIONAL DETAILS ALL
prior suicide attempts via drug overdose, erratic driving. recent interrupted attempt holding gun to head was stopped by friend

## 2023-05-16 NOTE — ED BEHAVIORAL HEALTH ASSESSMENT NOTE - DESCRIPTION
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

## 2023-05-17 PROCEDURE — 99223 1ST HOSP IP/OBS HIGH 75: CPT

## 2023-05-17 RX ORDER — SERTRALINE 25 MG/1
100 TABLET, FILM COATED ORAL
Refills: 0 | Status: DISCONTINUED | OUTPATIENT
Start: 2023-05-17 | End: 2023-05-23

## 2023-05-17 RX ADMIN — Medication 2 MILLIGRAM(S): at 20:40

## 2023-05-17 RX ADMIN — Medication 2 MILLIGRAM(S): at 11:04

## 2023-05-17 RX ADMIN — OXYCODONE HYDROCHLORIDE 5 MILLIGRAM(S): 5 TABLET ORAL at 10:18

## 2023-05-17 RX ADMIN — Medication 2 MILLIGRAM(S): at 16:58

## 2023-05-17 RX ADMIN — OXYCODONE HYDROCHLORIDE 5 MILLIGRAM(S): 5 TABLET ORAL at 18:00

## 2023-05-17 RX ADMIN — OXYCODONE HYDROCHLORIDE 5 MILLIGRAM(S): 5 TABLET ORAL at 22:53

## 2023-05-17 RX ADMIN — SERTRALINE 200 MILLIGRAM(S): 25 TABLET, FILM COATED ORAL at 10:18

## 2023-05-17 RX ADMIN — Medication 2 MILLIGRAM(S): at 20:39

## 2023-05-17 RX ADMIN — Medication 2 MILLIGRAM(S): at 14:13

## 2023-05-17 RX ADMIN — OXYCODONE HYDROCHLORIDE 5 MILLIGRAM(S): 5 TABLET ORAL at 11:03

## 2023-05-17 RX ADMIN — Medication 25 MILLIGRAM(S): at 20:39

## 2023-05-17 RX ADMIN — OXYCODONE HYDROCHLORIDE 5 MILLIGRAM(S): 5 TABLET ORAL at 16:53

## 2023-05-17 RX ADMIN — SERTRALINE 100 MILLIGRAM(S): 25 TABLET, FILM COATED ORAL at 20:39
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
## 2023-05-17 NOTE — BH INPATIENT PSYCHIATRY ASSESSMENT NOTE - MSE UNSTRUCTURED FT
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 with fair grooming and hygiene. She is calm and cooperative with fair eye contact. No psychomotor abnormalities noted. Speech is normal in rate and volume, spontaneous. Stated mood is "depressed." Affect is depressed, tearful, at times labile. TP is linear. TC: ongoing passive SI, denies suicidal intent or plan, denies HIIP, no evidence of delusions, +hopelessness. No perceptual disturbances noted. Insight is fair. Judgement is limited. Impulse control is fair, intact at this time.

## 2023-05-17 NOTE — BH INPATIENT PSYCHIATRY ASSESSMENT NOTE - NSBHCHARTREVIEWVS_PSY_A_CORE FT
Vital Signs Last 24 Hrs  T(C): 36.2 (05-17-23 @ 08:10), Max: 36.7 (05-16-23 @ 17:59)  T(F): 97.2 (05-17-23 @ 08:10), Max: 98 (05-16-23 @ 17:59)  HR: --  BP: --  BP(mean): --  RR: --  SpO2: --    Orthostatic VS  05-17-23 @ 08:10  Lying BP: --/-- HR: --  Sitting BP: 128/74 HR: 62  Standing BP: 108/68 HR: 60  Site: --  Mode: electronic  Orthostatic VS  05-16-23 @ 17:59  Lying BP: --/-- HR: --  Sitting BP: 133/90 HR: 100  Standing BP: 130/85 HR: 100  Site: --  Mode: --   Vital Signs Last 24 Hrs  T(C): 36.2 (05-17-23 @ 08:10), Max: 36.2 (05-17-23 @ 08:10)  T(F): 97.2 (05-17-23 @ 08:10), Max: 97.2 (05-17-23 @ 08:10)  HR: --  BP: --  BP(mean): --  RR: --  SpO2: --    Orthostatic VS  05-17-23 @ 08:10  Lying BP: --/-- HR: --  Sitting BP: 128/74 HR: 62  Standing BP: 108/68 HR: 60  Site: --  Mode: electronic  Orthostatic VS  05-16-23 @ 17:59  Lying BP: --/-- HR: --  Sitting BP: 133/90 HR: 100  Standing BP: 130/85 HR: 100  Site: --  Mode: --

## 2023-05-17 NOTE — PSYCHIATRIC REHAB INITIAL EVALUATION - NSBHPRRECOMMEND_PSY_ALL_CORE
Writer met with patient to orient patient to the unit and introduce psychiatric rehabilitation staff and functions. Upon entering patient’s room, patient was lying in bed. Patient reported that she is feeling tired and declined to participate in session with writer. Writer briefly provided information on the unit and groups. Patient was somewhat receptive to writer’s encouragement to attend groups for coping skills development. During the interaction, patient presented with flat affect. Patient was observed to be indifferent and depressed. Per chart, patient was admitted to [facility] L[facility] due to worsening mood and SI, precipitated by severe psychosocial stressors. Writer and patient were unable to collaboratively establish psychiatric rehabilitation goal, therefore, one will be assigned for the patient. SI, HI, AH, and VH could not be assessed. Psychiatric rehabilitation staff will provide support and assist patient’s transition to the hospital.

## 2023-05-17 NOTE — BH INPATIENT PSYCHIATRY ASSESSMENT NOTE - OTHER PAST PSYCHIATRIC HISTORY (INCLUDE DETAILS REGARDING ONSET, COURSE OF ILLNESS, INPATIENT/OUTPATIENT TREATMENT)
3 prior psychiatric admissions - [place], [facility] and [place] in 2020. 2 prior suicide attempt via erratic driving and polysubstance overdose. current psychiatrist is Dr. Martha Dennis.

## 2023-05-17 NOTE — BH INPATIENT PSYCHIATRY ASSESSMENT NOTE - NSBHASSESSSUMMFT_PSY_ALL_CORE
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, with PPHx dx MDD, anxiety, PTSD, three prior hospitalizations (most recent in 2020 at [place]), hx of several prior suicide attempts (driving erratically, drug overdose, putting a gun to her head), hx of polysubstance use (including frequent cocaine use, hx of attending rehab/detox), current MJ and ETOH use without known withdrawal, w/ PMHx bilateral salpingectomy and D&C on 4/24/23 at [facility], brought in by friend advised by outpatient psychiatrist for suicidal ideation and putting a gun to her head in the context of many stressors (recent surgery and now infertility, break-up with boyfriend in the context of him having an affair, loss of job and financial strain), EtOH use for the past week, and non-adherence with Zoloft for the past one week. 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, with PPHx dx MDD, anxiety, PTSD, three prior hospitalizations (most recent in 2020 at [place]), hx of several prior suicide attempts (driving erratically, drug overdose, putting a gun to her head), hx of polysubstance use (including frequent cocaine use, hx of attending rehab/detox), current MJ and ETOH use without known withdrawal, w/ PMHx bilateral salpingectomy and D&C (for ectopic pregnancy) on 4/24/23 at [facility], brought in by friend advised by outpatient psychiatrist for suicidal ideation and putting a gun to her head in the context of many stressors (recent surgery and related infertility, break-up with boyfriend in the context of him having an affair, loss of job and financial strain), EtOH use for the past week, and non-adherence with Zoloft for the past one week.    Pt remains depressed, tearful, with hopelessness and ongoing passive SI (though denies intent or plan, able to engage in safety planning). No evidence of EtOH withdrawal.     Plan:  1. Will c/w home med Zoloft 100mg BID  2. C/w Ativan 2mg qhs (switched from home med Xanax)  3. C/w symptom-triggered CIWA protocol with prn Ativan   4. C/w home med oxycodone IR 5mg q6h prn pain  5. Atarax 25mg prn anxiety, Ativan 2mg IM prn severe agitation  6. Admission labs and EKG reviewed, unremarkable aside from EKG with sinus juana and HR 46 (?), vitals currently stable, will consider repeat EKG  7. Expand collateral from outpt psychiatrist Dr. Dennis, sent secure email

## 2023-05-17 NOTE — BH INPATIENT PSYCHIATRY ASSESSMENT NOTE - NSICDXBHSECONDARYDX_PSY_ALL_CORE
Post traumatic stress disorder (PTSD)   F43.10  Cannabis use disorder, moderate, dependence   F12.20

## 2023-05-17 NOTE — BH INPATIENT PSYCHIATRY ASSESSMENT NOTE - CURRENT MEDICATION
MEDICATIONS  (STANDING):  LORazepam     Tablet 2 milliGRAM(s) Oral at bedtime  nicotine -  14 mG/24Hr(s) Patch 1 Patch Transdermal daily  sertraline 100 milliGRAM(s) Oral two times a day    MEDICATIONS  (PRN):  albuterol    90 MICROgram(s) HFA Inhaler 2 Puff(s) Inhalation every 6 hours PRN Shortness of Breath and/or Wheezing  hydrOXYzine hydrochloride 25 milliGRAM(s) Oral every 6 hours PRN Anxiety  LORazepam     Tablet 2 milliGRAM(s) Oral every 2 hours PRN CIWA score increase by 2 points and current CIWA score GREATER THAN 9  LORazepam   Injectable 2 milliGRAM(s) IntraMuscular Once PRN Agitation  nicotine  Polacrilex Gum 2 milliGRAM(s) Oral every 2 hours PRN breakthrough cravings  oxyCODONE    IR 5 milliGRAM(s) Oral every 6 hours PRN Moderate Pain (4 - 6)

## 2023-05-18 PROCEDURE — 99232 SBSQ HOSP IP/OBS MODERATE 35: CPT | Mod: GC

## 2023-05-18 PROCEDURE — 93010 ELECTROCARDIOGRAM REPORT: CPT

## 2023-05-18 RX ADMIN — Medication 2 MILLIGRAM(S): at 18:29

## 2023-05-18 RX ADMIN — SERTRALINE 100 MILLIGRAM(S): 25 TABLET, FILM COATED ORAL at 08:18

## 2023-05-18 RX ADMIN — OXYCODONE HYDROCHLORIDE 5 MILLIGRAM(S): 5 TABLET ORAL at 15:27

## 2023-05-18 RX ADMIN — OXYCODONE HYDROCHLORIDE 5 MILLIGRAM(S): 5 TABLET ORAL at 21:32

## 2023-05-18 RX ADMIN — Medication 2 MILLIGRAM(S): at 08:49

## 2023-05-18 RX ADMIN — Medication 2 MILLIGRAM(S): at 21:20

## 2023-05-18 RX ADMIN — Medication 0.5 MILLIGRAM(S): at 18:26

## 2023-05-18 RX ADMIN — OXYCODONE HYDROCHLORIDE 5 MILLIGRAM(S): 5 TABLET ORAL at 08:47

## 2023-05-18 RX ADMIN — Medication 2 MILLIGRAM(S): at 15:27

## 2023-05-18 RX ADMIN — Medication 1 PATCH: at 10:30

## 2023-05-18 RX ADMIN — SERTRALINE 100 MILLIGRAM(S): 25 TABLET, FILM COATED ORAL at 21:20

## 2023-05-18 RX ADMIN — Medication 2 MILLIGRAM(S): at 13:10

## 2023-05-18 RX ADMIN — Medication 25 MILLIGRAM(S): at 21:20

## 2023-05-18 RX ADMIN — Medication 0.5 MILLIGRAM(S): at 10:26

## 2023-05-18 RX ADMIN — OXYCODONE HYDROCHLORIDE 5 MILLIGRAM(S): 5 TABLET ORAL at 09:47

## 2023-05-18 RX ADMIN — OXYCODONE HYDROCHLORIDE 5 MILLIGRAM(S): 5 TABLET ORAL at 16:27

## 2023-05-18 RX ADMIN — Medication 2 MILLIGRAM(S): at 21:32

## 2023-05-18 NOTE — BH INPATIENT PSYCHIATRY PROGRESS NOTE - MSE UNSTRUCTURED FT
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 with fair grooming and hygiene. She is irritable and minimally cooperative with fair eye contact. No psychomotor abnormalities noted. Speech is normal in rate and volume, spontaneous. Stated mood is "anxious." Affect is irritable, tearful. TP is linear. TC: ongoing passive SI, denies suicidal intent or plan, denies HIIP, no evidence of delusions, +hopelessness, +preoccupied with receiving benzos for anxiety. No perceptual disturbances noted. Insight is fair. Judgement is limited. Impulse control is fair, intact at this time.  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 with fair grooming and hygiene. She is irritable and minimally cooperative with fair eye contact. No psychomotor abnormalities noted. Speech is normal in rate and volume, spontaneous. Stated mood is "anxious." Affect is irritable, angry, tearful. TP is linear, perseverative. TC: ongoing passive SI, denies suicidal intent or plan, denies HIIP, no evidence of delusions, +preoccupied with receiving benzos for anxiety. No perceptual disturbances noted. Insight is fair. Judgement is limited. Impulse control is fair, intact at this time.

## 2023-05-18 NOTE — BH SOCIAL WORK INITIAL PSYCHOSOCIAL EVALUATION - NSBHHOUSECOMMENTFT_PSY_ALL_CORE
Patient is currently living with boyfriend, who was discovered to be cheating on patient. Possible uncertainty if housing displacement is an issue.

## 2023-05-18 NOTE — BH INPATIENT PSYCHIATRY PROGRESS NOTE - NSBHASSESSSUMMFT_PSY_ALL_CORE
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, with PPHx dx MDD, anxiety, PTSD, three prior hospitalizations (most recent in 2020 at [place]), hx of several prior suicide attempts (driving erratically, drug overdose, putting a gun to her head), hx of polysubstance use (including frequent cocaine use, hx of attending rehab/detox), current MJ and ETOH use without known withdrawal, w/ PMHx bilateral salpingectomy and D&C (for ectopic pregnancy) on 4/24/23 at [facility], brought in by friend advised by outpatient psychiatrist for suicidal ideation and putting a gun to her head in the context of many stressors (recent surgery and related infertility, break-up with boyfriend in the context of him having an affair, loss of job and financial strain), EtOH use for the past week, and non-adherence with Zoloft for the past one week.    Pt is irritable, tearful, and demanding additional benzos for anxiety. Patient is vague regarding current SI (though denies intent or plan, able to engage in safety planning). No evidence of EtOH withdrawal.     Plan:  1. Will c/w home med Zoloft 100mg BID  2. Start Ativan 0.5mg q8h, prn for anxiety (switched from home med Xanax, per recommendation of outpatient psychiatrist, Dr. Dennis)  3. C/w symptom-triggered CIWA protocol with prn Ativan   4. C/w home med oxycodone IR 5mg q6h prn pain  5. Atarax 25mg prn anxiety, Ativan 2mg IM prn severe agitation  6. 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, with PPHx dx MDD, anxiety, PTSD, three prior hospitalizations (most recent in 2020 at [place]), hx of several prior suicide attempts (driving erratically, drug overdose, putting a gun to her head), hx of polysubstance use (including cocaine use, hx of attending rehab/detox), current MJ and ETOH use without known withdrawal, w/ PMHx bilateral salpingectomy and D&C (for ectopic pregnancy) on 4/24/23 at [facility], brought in by friend advised by outpatient psychiatrist for suicidal ideation and putting a gun to her head in the context of many stressors (recent surgery and related infertility, break-up with boyfriend in the context of him having an affair, loss of job and financial strain), EtOH use for the past week, and non-adherence with Zoloft for the past one week.    Pt is irritable, tearful, and demanding additional benzos for anxiety. Patient is vague regarding current SI (though denies intent or plan, able to engage in safety planning). No evidence of EtOH withdrawal.     Plan:  1. Will c/w home med Zoloft 100mg BID  2. C/w Ativan 2mg qhs + Ativan 0.5mg q8h prn anxiety (switched from home med Xanax 1mg BID, per recommendation of outpatient psychiatrist, Dr. Dennis)  3. C/w symptom-triggered CIWA protocol with prn Ativan   4. C/w home med oxycodone IR 5mg q6h prn pain  5. Atarax 25mg prn anxiety, Ativan 2mg IM prn severe agitation  6. Repeat EKG reviewed, notable for continued sinus bradycardia (though rate now at 57)

## 2023-05-18 NOTE — BH SOCIAL WORK INITIAL PSYCHOSOCIAL EVALUATION - NSBHSAALC_PSY_A_CORE FT
reports having "a few shots" prior to admission, reports regular use but denies daily drinking or withdrawal

## 2023-05-18 NOTE — BH INPATIENT PSYCHIATRY PROGRESS NOTE - NSBHFUPINTERVALHXFT_PSY_A_CORE
No acute overnight events reported. In the last 24 hours, patient received the following PRN medications: Ativan 2mg x 1 and Atarax 50mg x 1. Patient in good behavorial control and compliant with standing medications. Per sleep log, patient sleeping fairly.     Case discussed and reviewed with team. Per team, patient irritable, demanding PRN medication for anxiety. On exam, patient is irritable and tearful throughout exam. Patient reports poor sleep due to noise from peers on the unit. Patient endorses anxious mood (reporting chest pain, tremulousness, and "brain scrambling") and demanding that she immediately receive benzos. Team informed patient she is prescribed Xanax at bedtime, per documentation in chart and has Atarax as an alternative for anxiety during the day. Patient reports Atarax is only helpful for sleep (despite stating it was helpful for anxiety yesterday). Patient able to identify alternative coping skills, including walking, hiking, and being around her dogs, but continues to perseverate on getting benzos for anxiety. When asked about current SI, patient states, "I don't know". After interview, patient follows team out of her room and proceeds to contact her outpatient psychiatrist, demanding that team speak with her psychiatrist on phone in the day room (despite team informing the patient of our simultaneous attempt to contact her psychiatriast). After confirming dose of benzos, patient remains irritable, states that she "needs to go to her room before she punches someone". Team strongly encouraged patient to utilize coping skills and refrain from violent or aggressive behavior.  No acute overnight events reported. In the last 24 hours, patient received the following PRN medications: Ativan 2mg x 1, Atarax 50mg x 1, Oxycodone x3. Patient in fair behavorial control though demanding with regards to desired medication. Sleep was poor last night due to disruptions on the unit.     Case discussed and reviewed with team. Per team, patient irritable, demanding PRN medication for anxiety. On exam, patient is irritable and tearful throughout exam. Patient reports poor sleep due to noise from peers on the unit. Patient endorses anxious mood (reporting tremulousness and "brain scrambling") and demanding Ativan. She refuses Atarax. Patient able to identify alternative coping skills, including walking, hiking, drawing, and being around her dogs, but continues to perseverate on getting benzos for anxiety. When asked about current SI, patient states, "I don't know". After interview, patient follows team out of her room and proceeds to contact her outpatient psychiatrist, demanding that team speak with her psychiatrist on phone in the day room (despite team informing the patient of our simultaneous attempt to contact her psychiatrist). After confirming dose of benzos, patient remains irritable, states that she "needs to go to her room before she punches someone". Team strongly encouraged patient to utilize coping skills and refrain from violent or aggressive behavior.     Later in the day pt reports continued anxiety. Suggests that she is not getting the help she needs and asks about being discharged. We discuss the terms of voluntary hospitalization (including 3-day letter) as well as safety concerns related to recent suicide attempt. Pt says, "should I call my ?"

## 2023-05-18 NOTE — BH INPATIENT PSYCHIATRY PROGRESS NOTE - NSBHCONSBHPROVDETAILS_PSY_A_CORE  FT
sent secure email to Dr. Martha Dennis spoke with Dr. Martha Dennis at [phone]; discussed switching pt's xanax to ativan (dosed BID)

## 2023-05-18 NOTE — BH SOCIAL WORK INITIAL PSYCHOSOCIAL EVALUATION - OTHER PAST PSYCHIATRIC HISTORY (INCLUDE DETAILS REGARDING ONSET, COURSE OF ILLNESS, INPATIENT/OUTPATIENT TREATMENT)
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    3 prior psychiatric admissions - [facility] and [place] in 2020. 2 prior suicide attempt via erratic driving and polysubstance overdose. current psychiatrist is Dr. Martha Dennis. 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 3 prior psychiatric admissions - last admission in 2020 at [place]. Prior suicide attempts include driving erratically and a drug overdose. Patient has prior substance use without known withdrawal issues. She has a hx of trauma, no current legal issues. Patient has PMH bilateral salpingectomy and D&C on April 24th at [facility], along with  endometriosis, asthma. Patient was brought in by a friend as advised by outpatient psychiatrist for suicidal ideation.  Current psychiatrist is Dr. Martha Dennis.

## 2023-05-19 DIAGNOSIS — F60.3 BORDERLINE PERSONALITY DISORDER: ICD-10-CM

## 2023-05-19 PROCEDURE — 99232 SBSQ HOSP IP/OBS MODERATE 35: CPT

## 2023-05-19 PROCEDURE — 90853 GROUP PSYCHOTHERAPY: CPT

## 2023-05-19 RX ORDER — PANTOPRAZOLE SODIUM 20 MG/1
40 TABLET, DELAYED RELEASE ORAL
Refills: 0 | Status: DISCONTINUED | OUTPATIENT
Start: 2023-05-19 | End: 2023-05-23

## 2023-05-19 RX ORDER — OXYCODONE HYDROCHLORIDE 5 MG/1
5 TABLET ORAL EVERY 6 HOURS
Refills: 0 | Status: DISCONTINUED | OUTPATIENT
Start: 2023-05-19 | End: 2023-05-19

## 2023-05-19 RX ORDER — OXYCODONE HYDROCHLORIDE 5 MG/1
5 TABLET ORAL EVERY 6 HOURS
Refills: 0 | Status: DISCONTINUED | OUTPATIENT
Start: 2023-05-19 | End: 2023-05-22

## 2023-05-19 RX ADMIN — Medication 0.5 MILLIGRAM(S): at 08:36

## 2023-05-19 RX ADMIN — PANTOPRAZOLE SODIUM 40 MILLIGRAM(S): 20 TABLET, DELAYED RELEASE ORAL at 13:02

## 2023-05-19 RX ADMIN — Medication 25 MILLIGRAM(S): at 22:56

## 2023-05-19 RX ADMIN — Medication 2 MILLIGRAM(S): at 20:21

## 2023-05-19 RX ADMIN — OXYCODONE HYDROCHLORIDE 5 MILLIGRAM(S): 5 TABLET ORAL at 20:19

## 2023-05-19 RX ADMIN — OXYCODONE HYDROCHLORIDE 5 MILLIGRAM(S): 5 TABLET ORAL at 15:29

## 2023-05-19 RX ADMIN — SERTRALINE 100 MILLIGRAM(S): 25 TABLET, FILM COATED ORAL at 08:11

## 2023-05-19 RX ADMIN — Medication 0.5 MILLIGRAM(S): at 16:46

## 2023-05-19 RX ADMIN — OXYCODONE HYDROCHLORIDE 5 MILLIGRAM(S): 5 TABLET ORAL at 09:10

## 2023-05-19 RX ADMIN — Medication 2 MILLIGRAM(S): at 13:20

## 2023-05-19 RX ADMIN — Medication 2 MILLIGRAM(S): at 20:20

## 2023-05-19 RX ADMIN — Medication 2 MILLIGRAM(S): at 18:50

## 2023-05-19 RX ADMIN — Medication 2 MILLIGRAM(S): at 22:58

## 2023-05-19 RX ADMIN — SERTRALINE 100 MILLIGRAM(S): 25 TABLET, FILM COATED ORAL at 20:20

## 2023-05-19 RX ADMIN — OXYCODONE HYDROCHLORIDE 5 MILLIGRAM(S): 5 TABLET ORAL at 08:37

## 2023-05-19 RX ADMIN — Medication 2 MILLIGRAM(S): at 15:29

## 2023-05-19 RX ADMIN — Medication 1 PATCH: at 08:10

## 2023-05-19 RX ADMIN — Medication 1 PATCH: at 09:09

## 2023-05-19 RX ADMIN — OXYCODONE HYDROCHLORIDE 5 MILLIGRAM(S): 5 TABLET ORAL at 21:19

## 2023-05-19 RX ADMIN — Medication 1 PATCH: at 07:09

## 2023-05-19 NOTE — BH PSYCHOLOGY - CLINICIAN PSYCHOTHERAPY NOTE - NSBHPSYCHOLNARRATIVE_PSY_A_CORE FT
Psychology Extern met with the patient for an individual supportive therapy session to discuss patient's therapeutic goals. The limits of confidentiality were reviewed; patient verbally agreed to understanding the limits of confidentiality. Writer explored patient's thoughts related to the events that preceded her hospitalization. Patient reported that she was grieving the loss of her ability to get pregnant as she recently had a surgery that left her “sterile.” Writer offered empathy and support. Patient reported that she had a long history of multiple miscarriages. She said, “And the man that I live with, my spouse, didn’t even offer me a hug.” Writer offered validation and support. Patient said, “All of that made me want to kill myself.” Writer explored whether patient is currently experiencing suicidal ideation. Patient stated, “No I have so much to live for... I heard my nephew’s voice on the phone and I know I have so much love to give... I could adopt a child if I can’t have one myself.” Writer provided positive feedback. Patient noted that when she is at home, her partner makes her feel bad about herself and criticizes her for wanting to help other people. Writer explored patient’s statement further. Patient noted that he makes her think she cannot make decisions independently. Patient appeared to be negatively impacted by such interactions and reported that when she starts to feel bad about herself, she experiences suicidal ideation. Patient said, “I realized I have a lot more support than I thought I had... I don’t want to hurt myself.” As patient identified negative self-talk as a trigger for emotional distress, Writer encouraged patient to identify examples of five personal strengths for homework. Patient agreed to the homework and said, “I love that.” Patient agreed to continue meeting for individual supportive therapy sessions.     Patient appeared appropriately attired and adequately groomed. Patient reported mood as "good."  Sleep was reported to have been fine. Appetite was noted to be fine. Patient reported medication compliance as well as no side effects with medication use. Patient did not endorse Auditory Hallucinations; patient did not endorse Visual Hallucinations. Patient did not endorse suicidal or homicidal ideation/plan/intent. Patient agreed to inform staff should she experience suicidal or homicidal ideation/plant/intent. Gait was normal. No psychomotor retardation or psychomotor acceleration was observed during the interview.     Patient's attitude was pleasant and cooperative. Eye contact was appropriate. Speech was clear and understandable. Tone was normal. Rate was normal. Affect was congruent and appropriate. Thought process (observed) was linear and coherent. Sensorium was noted to be awake and alert. Concentration was good. Attention span was normal. Impulse control was questionable given her recent suicide attempts. Insight was questionable. Judgment was questionable.

## 2023-05-19 NOTE — BH PSYCHOLOGY - CLINICIAN PSYCHOTHERAPY NOTE - NSBHPSYCHOLGOALS_PSY_A_CORE
Decrease symptoms/Assessment/Improve level of independent functioning/Improve social/vocational/coping skills/Prevent relapse/Psychoeducation/Treatment compliance

## 2023-05-19 NOTE — BH PSYCHOLOGY - CLINICIAN PSYCHOTHERAPY NOTE - NSBHPSYCHOLINT_PSY_A_CORE
Cognitive/behavioral therapy/Problem-solving techniques discussed/Stress management/Supported coping skills/Supportive therapy/Treatment compliance encouraged

## 2023-05-19 NOTE — BH TREATMENT PLAN - NSTXSUICIDINTERPR_PSY_ALL_CORE
Over the next seven days, psychiatric rehabilitation team will engage with patient daily to support transition to the hospital and utilize individual and group therapy to assist patient in reaching the established psychiatric rehabilitation goal until discharge.

## 2023-05-19 NOTE — BH INPATIENT PSYCHIATRY PROGRESS NOTE - NSBHFUPINTERVALHXFT_PSY_A_CORE
No acute events overnight. Pt had difficulty falling asleep due to disruptive roommate though slept thereafter. She is compliant with meds and using prns of Ativan and Oxycodone for anxiety and pain. She reports improved mood and anxiety today, attributes this to switching from Xanax to Ativan. She is noted to attend all groups with very good participation, has been spending time outside as well planting in the garden. Social with peers. Pt now denies SIIP and says she is feeling more hopeful. Says she has "too much to live for" and speaks about her nephews and her dogs. Says she did not pull trigger of gun earlier this week as a result of thinking about her father. Reports ongoing abd pain related to surgery one month ago, says she never had the time to recover at home and put too much strain on her body. Pt encouraged to use oxycodone less frequently as possible and we discuss discontinuing it next week. Pt states hope of engaging in weekly therapy when she leaves.

## 2023-05-19 NOTE — BH PSYCHOLOGY - CLINICIAN PSYCHOTHERAPY NOTE - TOKEN PULL-DIAGNOSIS
Primary Diagnosis:  Major depressive disorder, recurrent episode, severe with anxious distress [F33.2]        Problem Dx:   Cannabis use disorder, moderate, dependence [F12.20]      Post traumatic stress disorder (PTSD) [F43.10]      Major depressive disorder, recurrent episode, severe with anxious distress [F33.2]

## 2023-05-19 NOTE — BH INPATIENT PSYCHIATRY PROGRESS NOTE - NSBHASSESSSUMMFT_PSY_ALL_CORE
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, with PPHx dx MDD, anxiety, PTSD, three prior hospitalizations (most recent in 2020 at [place]), hx of several prior suicide attempts (driving erratically, drug overdose, putting a gun to her head), hx of polysubstance use (including cocaine use, hx of attending rehab/detox), current MJ and ETOH use without known withdrawal, w/ PMHx bilateral salpingectomy and D&C (for ectopic pregnancy) on 4/24/23 at [facility], brought in by friend advised by outpatient psychiatrist for suicidal ideation and putting a gun to her head in the context of many stressors (recent surgery and related infertility, break-up with boyfriend in the context of him having an affair, loss of job and financial strain), EtOH use for the past week, and non-adherence with Zoloft for the past one week.    Pt with improved mood today, much less anxious and irritable, more cooperative. She is very engaged in unit milieu and participating in groups and individual therapy. She now denies SIIP and reports feeling hopeful.     Plan:  1. Will c/w home med Zoloft 100mg BID  2. C/w Ativan 2mg qhs + Ativan 0.5mg q8h prn anxiety (switched from home med Xanax 1mg BID, per recommendation of outpatient psychiatrist, Dr. Dennis)  3. D/c symptom-triggered CIWA protocol (no evidence of EtOH withdrawal)  4. C/w home med oxycodone IR 5mg q6h prn pain (plan to d/c next week)  5. Atarax 25mg prn anxiety, Ativan 2mg IM prn severe agitation

## 2023-05-19 NOTE — BH TREATMENT PLAN - NSTXSUICIDINTERRN_PSY_ALL_CORE
Assess pt for suicidal ideation, intent, plan, and self-injurious behavior, provide support, maintain safety, explore healthy coping skills and protective factors, identify triggers, encourage pt to participate in groups and unit activities, administer and educate on ordered medications

## 2023-05-19 NOTE — BH INPATIENT PSYCHIATRY PROGRESS NOTE - MSE UNSTRUCTURED FT
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 with good grooming and hygiene. She is calm and cooperative with good eye contact. No psychomotor abnormalities noted. Speech is normal in rate and volume, spontaneous. Stated mood is "better." Affect is euthymic, full. TP is linear. TC: denies SIIP, feeling more hopeful, no evidence of delusions. No perceptual disturbances noted. Insight is fair. Judgement is fair. Impulse control is fair, intact at this time.

## 2023-05-20 PROCEDURE — 99231 SBSQ HOSP IP/OBS SF/LOW 25: CPT

## 2023-05-20 RX ADMIN — OXYCODONE HYDROCHLORIDE 5 MILLIGRAM(S): 5 TABLET ORAL at 20:18

## 2023-05-20 RX ADMIN — Medication 0.5 MILLIGRAM(S): at 19:18

## 2023-05-20 RX ADMIN — Medication 1 PATCH: at 13:30

## 2023-05-20 RX ADMIN — Medication 0.5 MILLIGRAM(S): at 11:10

## 2023-05-20 RX ADMIN — Medication 2 MILLIGRAM(S): at 13:20

## 2023-05-20 RX ADMIN — SERTRALINE 100 MILLIGRAM(S): 25 TABLET, FILM COATED ORAL at 20:37

## 2023-05-20 RX ADMIN — Medication 2 MILLIGRAM(S): at 22:08

## 2023-05-20 RX ADMIN — Medication 25 MILLIGRAM(S): at 21:32

## 2023-05-20 RX ADMIN — OXYCODONE HYDROCHLORIDE 5 MILLIGRAM(S): 5 TABLET ORAL at 19:18

## 2023-05-20 RX ADMIN — Medication 2 MILLIGRAM(S): at 21:32

## 2023-05-20 RX ADMIN — PANTOPRAZOLE SODIUM 40 MILLIGRAM(S): 20 TABLET, DELAYED RELEASE ORAL at 09:24

## 2023-05-20 RX ADMIN — SERTRALINE 100 MILLIGRAM(S): 25 TABLET, FILM COATED ORAL at 09:25

## 2023-05-20 RX ADMIN — OXYCODONE HYDROCHLORIDE 5 MILLIGRAM(S): 5 TABLET ORAL at 13:19

## 2023-05-20 RX ADMIN — OXYCODONE HYDROCHLORIDE 5 MILLIGRAM(S): 5 TABLET ORAL at 14:30

## 2023-05-20 RX ADMIN — Medication 2 MILLIGRAM(S): at 19:19

## 2023-05-20 NOTE — BH INPATIENT PSYCHIATRY PROGRESS NOTE - NSBHASSESSSUMMFT_PSY_ALL_CORE
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, with PPHx dx MDD, anxiety, PTSD, three prior hospitalizations (most recent in 2020 at [place]), hx of several prior suicide attempts (driving erratically, drug overdose, putting a gun to her head), hx of polysubstance use (including cocaine use, hx of attending rehab/detox), current MJ and ETOH use without known withdrawal, w/ PMHx bilateral salpingectomy and D&C (for ectopic pregnancy) on 4/24/23 at [facility], brought in by friend advised by outpatient psychiatrist for suicidal ideation and putting a gun to her head in the context of many stressors (recent surgery and related infertility, break-up with boyfriend in the context of him having an affair, loss of job and financial strain), EtOH use for the past week, and non-adherence with Zoloft for the past one week.    On assessment, patient was irritable in context of poor sleep but otherwise reported improved mood. She denies any SI/HI.     Plan:  1. Will c/w home med Zoloft 100mg BID  2. C/w Ativan 2mg qhs + Ativan 0.5mg q8h prn anxiety (switched from home med Xanax 1mg BID, per recommendation of outpatient psychiatrist, Dr. Dennis)  3. D/c symptom-triggered CIWA protocol (no evidence of EtOH withdrawal)  4. C/w home med oxycodone IR 5mg q6h prn pain (plan to d/c next week)  5. Atarax 25mg prn anxiety, Ativan 2mg IM prn severe agitation

## 2023-05-20 NOTE — BH INPATIENT PSYCHIATRY PROGRESS NOTE - NSBHFUPINTERVALHXFT_PSY_A_CORE
Chart reviewed and case discussed with treatment team. No events reported overnight. Sleep is poor and appetite is fair. Patient stated that she was not able to fall asleep until 3am due to the disruption on the unit. She denies any AVH or SI/HI. Patient is compliant with medications, no adverse effects reported.

## 2023-05-21 PROCEDURE — 99231 SBSQ HOSP IP/OBS SF/LOW 25: CPT

## 2023-05-21 RX ORDER — CYCLOBENZAPRINE HYDROCHLORIDE 10 MG/1
10 TABLET, FILM COATED ORAL ONCE
Refills: 0 | Status: COMPLETED | OUTPATIENT
Start: 2023-05-21 | End: 2023-05-21

## 2023-05-21 RX ADMIN — Medication 2 MILLIGRAM(S): at 20:25

## 2023-05-21 RX ADMIN — OXYCODONE HYDROCHLORIDE 5 MILLIGRAM(S): 5 TABLET ORAL at 21:25

## 2023-05-21 RX ADMIN — SERTRALINE 100 MILLIGRAM(S): 25 TABLET, FILM COATED ORAL at 20:25

## 2023-05-21 RX ADMIN — Medication 1 PATCH: at 14:32

## 2023-05-21 RX ADMIN — OXYCODONE HYDROCHLORIDE 5 MILLIGRAM(S): 5 TABLET ORAL at 01:30

## 2023-05-21 RX ADMIN — CYCLOBENZAPRINE HYDROCHLORIDE 10 MILLIGRAM(S): 10 TABLET, FILM COATED ORAL at 02:38

## 2023-05-21 RX ADMIN — OXYCODONE HYDROCHLORIDE 5 MILLIGRAM(S): 5 TABLET ORAL at 13:40

## 2023-05-21 RX ADMIN — Medication 1 PATCH: at 08:33

## 2023-05-21 RX ADMIN — Medication 2 MILLIGRAM(S): at 15:33

## 2023-05-21 RX ADMIN — OXYCODONE HYDROCHLORIDE 5 MILLIGRAM(S): 5 TABLET ORAL at 12:51

## 2023-05-21 RX ADMIN — PANTOPRAZOLE SODIUM 40 MILLIGRAM(S): 20 TABLET, DELAYED RELEASE ORAL at 08:33

## 2023-05-21 RX ADMIN — OXYCODONE HYDROCHLORIDE 5 MILLIGRAM(S): 5 TABLET ORAL at 20:25

## 2023-05-21 RX ADMIN — Medication 25 MILLIGRAM(S): at 20:25

## 2023-05-21 RX ADMIN — Medication 0.5 MILLIGRAM(S): at 22:49

## 2023-05-21 RX ADMIN — Medication 2 MILLIGRAM(S): at 20:27

## 2023-05-21 RX ADMIN — Medication 0.5 MILLIGRAM(S): at 14:28

## 2023-05-21 RX ADMIN — SERTRALINE 100 MILLIGRAM(S): 25 TABLET, FILM COATED ORAL at 08:34

## 2023-05-21 RX ADMIN — OXYCODONE HYDROCHLORIDE 5 MILLIGRAM(S): 5 TABLET ORAL at 02:30

## 2023-05-21 RX ADMIN — Medication 1 PATCH: at 07:41

## 2023-05-21 RX ADMIN — Medication 2 MILLIGRAM(S): at 17:50

## 2023-05-21 RX ADMIN — Medication 0.5 MILLIGRAM(S): at 05:43

## 2023-05-21 RX ADMIN — Medication 2 MILLIGRAM(S): at 22:51

## 2023-05-21 NOTE — BH INPATIENT PSYCHIATRY PROGRESS NOTE - NSBHASSESSSUMMFT_PSY_ALL_CORE
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, with PPHx dx MDD, anxiety, PTSD, three prior hospitalizations (most recent in 2020 at [place]), hx of several prior suicide attempts (driving erratically, drug overdose, putting a gun to her head), hx of polysubstance use (including cocaine use, hx of attending rehab/detox), current MJ and ETOH use without known withdrawal, w/ PMHx bilateral salpingectomy and D&C (for ectopic pregnancy) on 4/24/23 at [facility], brought in by friend advised by outpatient psychiatrist for suicidal ideation and putting a gun to her head in the context of many stressors (recent surgery and related infertility, break-up with boyfriend in the context of him having an affair, loss of job and financial strain), EtOH use for the past week, and non-adherence with Zoloft for the past one week.    On assessment, patient was calm and cooperative. She reported poor sleep due to other patients being disruptive on the unit. She denies any SI/HI and is focused on discharge.     Plan:  1. Will c/w home med Zoloft 100mg BID  2. C/w Ativan 2mg qhs + Ativan 0.5mg q8h prn anxiety (switched from home med Xanax 1mg BID, per recommendation of outpatient psychiatrist, Dr. Dennis)  3. D/c symptom-triggered CIWA protocol (no evidence of EtOH withdrawal)  4. C/w home med oxycodone IR 5mg q6h prn pain (plan to d/c next week)  5. Atarax 25mg prn anxiety, Ativan 2mg IM prn severe agitation

## 2023-05-21 NOTE — BH INPATIENT PSYCHIATRY PROGRESS NOTE - NSBHFUPINTERVALHXFT_PSY_A_CORE
Chart reviewed and case discussed with treatment team. No events reported overnight. Sleep is poor and appetite is fair. Patient reported that she was unable to sleep due to other patients being disruptive on the unit. Patient was sleeping in this morning and is focused on discharge. She denies any AVH or SI/HI. Patient is compliant with medications, no adverse effects reported.

## 2023-05-22 VITALS — SYSTOLIC BLOOD PRESSURE: 107 MMHG | HEART RATE: 73 BPM | TEMPERATURE: 98 F | DIASTOLIC BLOOD PRESSURE: 67 MMHG

## 2023-05-22 PROCEDURE — 99232 SBSQ HOSP IP/OBS MODERATE 35: CPT | Mod: GC

## 2023-05-22 RX ORDER — NICOTINE POLACRILEX 2 MG
1 GUM BUCCAL
Qty: 1 | Refills: 0
Start: 2023-05-22 | End: 2023-06-04

## 2023-05-22 RX ORDER — PANTOPRAZOLE SODIUM 20 MG/1
1 TABLET, DELAYED RELEASE ORAL
Qty: 30 | Refills: 0
Start: 2023-05-22 | End: 2023-06-20

## 2023-05-22 RX ORDER — ALBUTEROL 90 UG/1
2 AEROSOL, METERED ORAL
Qty: 0 | Refills: 0 | DISCHARGE
Start: 2023-05-22

## 2023-05-22 RX ORDER — IBUPROFEN 200 MG
400 TABLET ORAL EVERY 6 HOURS
Refills: 0 | Status: DISCONTINUED | OUTPATIENT
Start: 2023-05-22 | End: 2023-05-22

## 2023-05-22 RX ORDER — NICOTINE POLACRILEX 2 MG
1 GUM BUCCAL
Qty: 2 | Refills: 0
Start: 2023-05-22 | End: 2023-06-20

## 2023-05-22 RX ORDER — HYDROXYZINE HCL 10 MG
1 TABLET ORAL
Qty: 60 | Refills: 0
Start: 2023-05-22 | End: 2023-06-20

## 2023-05-22 RX ORDER — IBUPROFEN 200 MG
1 TABLET ORAL
Qty: 0 | Refills: 0 | DISCHARGE
Start: 2023-05-22

## 2023-05-22 RX ORDER — IBUPROFEN 200 MG
400 TABLET ORAL EVERY 6 HOURS
Refills: 0 | Status: DISCONTINUED | OUTPATIENT
Start: 2023-05-22 | End: 2023-05-23

## 2023-05-22 RX ORDER — ACETAMINOPHEN 500 MG
2 TABLET ORAL
Qty: 0 | Refills: 0 | DISCHARGE
Start: 2023-05-22

## 2023-05-22 RX ORDER — ACETAMINOPHEN 500 MG
650 TABLET ORAL EVERY 6 HOURS
Refills: 0 | Status: DISCONTINUED | OUTPATIENT
Start: 2023-05-22 | End: 2023-05-22

## 2023-05-22 RX ORDER — SERTRALINE 25 MG/1
1 TABLET, FILM COATED ORAL
Qty: 60 | Refills: 0
Start: 2023-05-22 | End: 2023-06-20

## 2023-05-22 RX ORDER — ACETAMINOPHEN 500 MG
650 TABLET ORAL EVERY 6 HOURS
Refills: 0 | Status: DISCONTINUED | OUTPATIENT
Start: 2023-05-22 | End: 2023-05-23

## 2023-05-22 RX ADMIN — Medication 25 MILLIGRAM(S): at 21:05

## 2023-05-22 RX ADMIN — Medication 2 MILLIGRAM(S): at 08:33

## 2023-05-22 RX ADMIN — Medication 0.5 MILLIGRAM(S): at 08:30

## 2023-05-22 RX ADMIN — OXYCODONE HYDROCHLORIDE 5 MILLIGRAM(S): 5 TABLET ORAL at 12:38

## 2023-05-22 RX ADMIN — SERTRALINE 100 MILLIGRAM(S): 25 TABLET, FILM COATED ORAL at 07:45

## 2023-05-22 RX ADMIN — PANTOPRAZOLE SODIUM 40 MILLIGRAM(S): 20 TABLET, DELAYED RELEASE ORAL at 07:44

## 2023-05-22 RX ADMIN — Medication 0.5 MILLIGRAM(S): at 17:01

## 2023-05-22 RX ADMIN — Medication 2 MILLIGRAM(S): at 15:31

## 2023-05-22 RX ADMIN — Medication 25 MILLIGRAM(S): at 11:03

## 2023-05-22 RX ADMIN — Medication 2 MILLIGRAM(S): at 21:05

## 2023-05-22 RX ADMIN — OXYCODONE HYDROCHLORIDE 5 MILLIGRAM(S): 5 TABLET ORAL at 11:03

## 2023-05-22 RX ADMIN — Medication 1 PATCH: at 08:11

## 2023-05-22 RX ADMIN — Medication 2 MILLIGRAM(S): at 21:01

## 2023-05-22 RX ADMIN — SERTRALINE 100 MILLIGRAM(S): 25 TABLET, FILM COATED ORAL at 21:01

## 2023-05-22 RX ADMIN — Medication 1 PATCH: at 07:45

## 2023-05-22 RX ADMIN — Medication 1 PATCH: at 10:24

## 2023-05-22 RX ADMIN — Medication 2 MILLIGRAM(S): at 19:49

## 2023-05-22 NOTE — BH INPATIENT PSYCHIATRY PROGRESS NOTE - NSBHASSESSSUMMFT_PSY_ALL_CORE
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, with PPHx dx MDD, anxiety, PTSD, three prior hospitalizations (most recent in 2020 at [place]), hx of several prior suicide attempts (driving erratically, drug overdose, putting a gun to her head), hx of polysubstance use (including cocaine use, hx of attending rehab/detox), current MJ and ETOH use without known withdrawal, w/ PMHx bilateral salpingectomy and D&C (for ectopic pregnancy) on 4/24/23 at [facility], brought in by friend advised by outpatient psychiatrist for suicidal ideation and putting a gun to her head in the context of many stressors (recent surgery and related infertility, break-up with boyfriend in the context of him having an affair, loss of job and financial strain), EtOH use for the past week, and non-adherence with Zoloft for the past one week.    On assessment, patient was irritable in context of poor sleep but otherwise reported improved mood. She denies any SI/HI.     Plan:  1. Will c/w home med Zoloft 100mg BID  2. C/w Ativan 2mg qhs + Ativan 0.5mg q8h prn anxiety (switched from home med Xanax 1mg BID, per recommendation of outpatient psychiatrist, Dr. Dennis)  3. D/c symptom-triggered CIWA protocol (no evidence of EtOH withdrawal)  4. C/w home med oxycodone IR 5mg q6h prn pain (plan to d/c next week)  5. 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, with PPHx dx MDD, anxiety, PTSD, three prior hospitalizations (most recent in 2020 at [place]), hx of several prior suicide attempts (driving erratically, drug overdose, putting a gun to her head), hx of polysubstance use (including cocaine use, hx of attending rehab/detox), current MJ and ETOH use without known withdrawal, w/ PMHx bilateral salpingectomy and D&C (for ectopic pregnancy) on 4/24/23 at [facility], brought in by friend advised by outpatient psychiatrist for suicidal ideation and putting a gun to her head in the context of many stressors (recent surgery and related infertility, break-up with boyfriend in the context of him having an affair, loss of job and financial strain), EtOH use for the past week, and non-adherence with Zoloft for the past one week.    Patient submitted 3DL this morning, requesting discharge. Discussed safety concerns and need for further hospitalization, given severity of suicidality on admission. She now reports improved mood and better sleep last night. Patient denies SIIP and is able to identify protective factors (her step-mother, pets).    Plan:  1. Will c/w home med Zoloft 100mg BID  2. C/w Ativan 2mg qhs + Ativan 0.5mg q8h prn anxiety (switched from home med Xanax 1mg BID, per recommendation of outpatient psychiatrist, Dr. Dennis)  3. D/c symptom-triggered CIWA protocol (no evidence of EtOH withdrawal)  4. d/c home med oxycodone IR 5mg q6h prn pain; start Tylenol 625mg/ibuprofen 400mg q6h, prn for pain   5. 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, with PPHx dx MDD, anxiety, PTSD, three prior hospitalizations (most recent in 2020 at [place]), hx of several prior suicide attempts (driving erratically, drug overdose, putting a gun to her head), hx of polysubstance use (including cocaine use, hx of attending rehab/detox), current MJ and ETOH use without known withdrawal, w/ PMHx bilateral salpingectomy and D&C (for ectopic pregnancy) on 4/24/23 at [facility], brought in by friend advised by outpatient psychiatrist for suicidal ideation and putting a gun to her head in the context of many stressors (recent surgery and related infertility, break-up with boyfriend in the context of him having an affair, loss of job and financial strain), EtOH use for the past week, and non-adherence with Zoloft for the past one week.    Patient submitted 3DL this morning, requesting discharge. Discussed safety concerns and need for further hospitalization, given severity of suicidality on admission. She now reports improved mood and better sleep last night. Patient denies SIIP and is able to identify protective factors (her step-mother, pets).    Plan:  1. Will c/w home med Zoloft 100mg BID  2. C/w Ativan 2mg qhs + Ativan 0.5mg q8h prn anxiety (switched from home med Xanax 1mg BID, per recommendation of outpatient psychiatrist, Dr. Dennis)  3. Have discontinued symptom-triggered CIWA protocol (no evidence of EtOH withdrawal)  4. d/c home med oxycodone IR 5mg q6h prn pain; start Tylenol 625mg/ibuprofen 400mg q6h, prn for pain   5. Atarax 25mg prn anxiety, Ativan 2mg IM prn severe agitation

## 2023-05-22 NOTE — BH INPATIENT PSYCHIATRY PROGRESS NOTE - MSE UNSTRUCTURED FT
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 with good grooming and hygiene. She is calm and cooperative, irritable at times, with good eye contact. No psychomotor abnormalities noted. Speech is normal in rate and volume, spontaneous. Stated mood is "better." Affect is euthymic, full. TP is linear. TC: denies SIIP, feeling more hopeful, no evidence of delusions. No perceptual disturbances noted. Insight is fair. Judgement is fair. Impulse control is fair, intact at this time.

## 2023-05-22 NOTE — BH INPATIENT PSYCHIATRY PROGRESS NOTE - NSBHFUPINTERVALHXFT_PSY_A_CORE
No acute overnight events reported. In the last 24 hours, patient received the following PRN medications: Ativan 2mg x 3, Atarax 50mg x 1, Oxycodone x3. Patient in good behavorial control and compliant with medication. Per sleep log, patient sleeping fairly.     Case discussed and reviewed with team. Per team, patient is pleasant and social with select peers, but remains anxious and depressed. On exam, patient is calm and cooperative, but irritable at times. Patient continues to report disruptions her sleep due to disruptions on the unit, but does report better sleep last night. Over the weekend, patient received visits from her family, which she describes as positive. Patient states that she feels well-supported and is looking forward to returning home. Patient still plans on returning to the home she shares with her partner. She state her mother will be staying with her in the home while she visits her from out of state. She reports Ativan has been helpful with her anxiety and Atarax has improved her insomnia. Patient remains discharge-focused. She is requesting to be discharged today and reports that she has family       On exam, patient is irritable and tearful throughout exam. Patient reports poor sleep due to noise from peers on the unit. Patient endorses anxious mood (reporting tremulousness and "brain scrambling") and demanding Ativan. She refuses Atarax. Patient able to identify alternative coping skills, including walking, hiking, drawing, and being around her dogs, but continues to perseverate on getting benzos for anxiety. When asked about current SI, patient states, "I don't know". After interview, patient follows team out of her room and proceeds to contact her outpatient psychiatrist, demanding that team speak with her psychiatrist on phone in the day room (despite team informing the patient of our simultaneous attempt to contact her psychiatrist). After confirming dose of benzos, patient remains irritable, states that she "needs to go to her room before she punches someone". Team strongly encouraged patient to utilize coping skills and refrain from violent or aggressive behavior.      No acute overnight events reported. In the last 24 hours, patient received the following PRN medications: Ativan 2mg x 3, Atarax 50mg x 1, Oxycodone x3. Patient in good behavorial control and compliant with medication. Per sleep log, patient sleeping fairly.     Case discussed and reviewed with team. Per team, patient is pleasant and social with select peers, but remains anxious and depressed. On exam, patient is calm and cooperative, but irritable at times. Patient continues to report disruptions her sleep due to disruptions on the unit, but does report better sleep last night. Over the weekend, patient received visits from her family, which she describes as positive. Patient states that she feels well-supported and is looking forward to returning home. Patient still plans on returning to the home she shares with her partner. She state her mother will be staying with her in the home while she visits her from out of state. She reports Ativan has been helpful with her anxiety and Atarax has improved her insomnia. Patient is agreeable with telehealth for therapy services and plans to continue with Dr. Wong. Reneies MIRIAM WARREN AV. Patient remains discharge-focused. She is requesting to be discharged today and reports that she has family is on their way to pick her up. Team encouraged patient to continue treatment given severity of recent suicidal ideations.     With regard to her pain, patient reports current level of pain is 5/10. Patient reports Flexeril (that was ordered by PA) was not helpful. She reports the scar at her incision site is now re-opening (on PE, no visible dehiscence appreciated). Patient aware and agreeable with plan to discontinue oxycodone (as discussed with her OBGYN) and use NSAIDS, prn for pain (states that she cannot take naproxen, ok with Tylenol and ibuprofen).      Spoke to patient's friend, Jacqueline.      No acute overnight events reported. In the last 24 hours, patient received the following PRN medications: Ativan 2mg x 3, Atarax 50mg x 1, Oxycodone x3. Patient in good behavorial control and compliant with medication. Per sleep log, patient sleeping fairly.     Case discussed and reviewed with team. Per team, patient is pleasant and social with select peers, but remains anxious and depressed. On exam, patient is calm and cooperative, but irritable at times. Patient continues to report disruptions her sleep due to disruptions on the unit, but does report better sleep last night. Over the weekend, patient received visits from her family, which she describes as positive. Patient states that she feels well-supported and is looking forward to returning home. Patient still plans on returning to the home she shares with her partner. She state her mother will be staying with her in the home while she visits her from out of state. She reports Ativan has been helpful with her anxiety and Atarax has improved her insomnia. Patient is agreeable with telehealth for therapy services and plans to continue with Dr. Dennis. Reneies MIRIAM WARREN AVH. Patient remains discharge-focused. She is requesting to be discharged today and reports that she has family is on their way to pick her up. Team encouraged patient to continue treatment given severity of recent suicidal ideations.     With regard to her pain, patient reports current level of pain is 5/10. Patient reports Flexeril (that was ordered by PA) was not helpful. She reports the scar at her incision site is now re-opening (on PE, no visible dehiscence appreciated). Patient aware and agreeable with plan to discontinue oxycodone (as discussed with her OBGYN) and use NSAIDS, prn for pain (states that she cannot take naproxen, ok with Tylenol and ibuprofen).      Spoke to patient's friend, Jacqueline. She report that patient looks "fantastic", in comparison to earlier in the hospital course and he initial presentation. She ha sno acute safety concerns and states she plans on continue to support the patient after she is discharged (i.e states that she plans on taking walks with the patient). She is advocating for discharge tomorrow, as she believes the patient will start to decompensate with prolonged hospitalization.      No acute overnight events reported. In the last 24 hours, patient received the following PRN medications: Ativan 0.5mg x 3, Atarax 50mg x 1, Oxycodone x3. Patient in good behavorial control and compliant with medication. Per sleep log, patient slept well last night.     Case discussed and reviewed with team. Per team, patient is pleasant and social with select peers, but remains intermittently anxious and depressed. On exam, patient is calm and cooperative, but irritable at times. Patient continues to report poor sleep at times due to disruptions on the unit, but does report better sleep last night. Over the weekend, patient received visits from her family, which she describes as positive. Patient states that she feels well-supported and is looking forward to returning home. Patient still plans on returning to the home she shares with her partner. She states her mother will be staying with her in the home while she visits her from out of state. She reports Ativan has been helpful with her anxiety and Atarax has improved her insomnia. Patient is agreeable with telehealth for therapy services and plans to continue with Dr. Dennis. MIRIAM Patel AV. Patient is discharge-focused and submitted 3-day letter this morning.    With regards to her pain, patient reports current level of pain is 5/10. Patient reports Flexeril (that was ordered by PA) was not helpful. She reports the scar at her incision site is now re-opening (on PE, no visible dehiscence appreciated). Patient aware and agreeable with plan to discontinue oxycodone (as discussed with her OBGYN) and use NSAIDS prn for pain (states that she cannot take naproxen, ok with Tylenol and ibuprofen).      Spoke to patient's friend, Jacqueline. She report that patient looks "fantastic", in comparison to earlier in the hospital course and her initial presentation. She has no acute safety concerns and states she plans on continue to support the patient after she is discharged (i.e states that she plans on taking walks with the patient). She is advocating for discharge tomorrow, as she believes the patient will start to decompensate with prolonged hospitalization.

## 2023-05-23 PROCEDURE — 99239 HOSP IP/OBS DSCHRG MGMT >30: CPT

## 2023-05-23 RX ADMIN — PANTOPRAZOLE SODIUM 40 MILLIGRAM(S): 20 TABLET, DELAYED RELEASE ORAL at 08:24

## 2023-05-23 RX ADMIN — Medication 1 PATCH: at 08:24

## 2023-05-23 RX ADMIN — Medication 0.5 MILLIGRAM(S): at 09:38

## 2023-05-23 RX ADMIN — SERTRALINE 100 MILLIGRAM(S): 25 TABLET, FILM COATED ORAL at 08:24

## 2023-05-23 RX ADMIN — Medication 1 PATCH: at 08:56

## 2023-05-23 RX ADMIN — Medication 1 PATCH: at 08:57

## 2023-05-23 NOTE — BH PSYCHOLOGY - GROUP THERAPY NOTE - NSBHPSYCHOLRESPONSE_PSY_A_CORE
Symptoms reduced/Coping skills acquired/Accepted support
Symptoms reduced/Coping skills acquired/Accepted support

## 2023-05-23 NOTE — BH INPATIENT PSYCHIATRY PROGRESS NOTE - PRN MEDS
MEDICATIONS  (PRN):  albuterol    90 MICROgram(s) HFA Inhaler 2 Puff(s) Inhalation every 6 hours PRN Shortness of Breath and/or Wheezing  hydrOXYzine hydrochloride 25 milliGRAM(s) Oral every 6 hours PRN Anxiety  LORazepam     Tablet 0.5 milliGRAM(s) Oral every 8 hours PRN anxiety  LORazepam     Tablet 2 milliGRAM(s) Oral every 2 hours PRN CIWA score increase by 2 points and current CIWA score GREATER THAN 9  LORazepam   Injectable 2 milliGRAM(s) IntraMuscular Once PRN Agitation  nicotine  Polacrilex Gum 2 milliGRAM(s) Oral every 2 hours PRN breakthrough cravings  oxyCODONE    IR 5 milliGRAM(s) Oral every 6 hours PRN Moderate Pain (4 - 6)  
MEDICATIONS  (PRN):  acetaminophen     Tablet .. 650 milliGRAM(s) Oral every 6 hours PRN Mild Pain (1 - 3), Moderate Pain (4 - 6)  albuterol    90 MICROgram(s) HFA Inhaler 2 Puff(s) Inhalation every 6 hours PRN Shortness of Breath and/or Wheezing  hydrOXYzine hydrochloride 25 milliGRAM(s) Oral every 6 hours PRN Anxiety  ibuprofen  Tablet. 400 milliGRAM(s) Oral every 6 hours PRN Mild Pain (1 - 3), Moderate Pain (4 - 6)  LORazepam     Tablet 0.5 milliGRAM(s) Oral every 8 hours PRN anxiety  LORazepam   Injectable 2 milliGRAM(s) IntraMuscular Once PRN Agitation  nicotine  Polacrilex Gum 2 milliGRAM(s) Oral every 2 hours PRN breakthrough cravings  
MEDICATIONS  (PRN):  acetaminophen     Tablet .. 650 milliGRAM(s) Oral every 6 hours PRN Mild Pain (1 - 3), Moderate Pain (4 - 6)  albuterol    90 MICROgram(s) HFA Inhaler 2 Puff(s) Inhalation every 6 hours PRN Shortness of Breath and/or Wheezing  hydrOXYzine hydrochloride 25 milliGRAM(s) Oral every 6 hours PRN Anxiety  ibuprofen  Tablet. 400 milliGRAM(s) Oral every 6 hours PRN Mild Pain (1 - 3), Moderate Pain (4 - 6)  LORazepam     Tablet 0.5 milliGRAM(s) Oral every 8 hours PRN anxiety  LORazepam   Injectable 2 milliGRAM(s) IntraMuscular Once PRN Agitation  nicotine  Polacrilex Gum 2 milliGRAM(s) Oral every 2 hours PRN breakthrough cravings  
MEDICATIONS  (PRN):  albuterol    90 MICROgram(s) HFA Inhaler 2 Puff(s) Inhalation every 6 hours PRN Shortness of Breath and/or Wheezing  hydrOXYzine hydrochloride 25 milliGRAM(s) Oral every 6 hours PRN Anxiety  LORazepam     Tablet 0.5 milliGRAM(s) Oral every 8 hours PRN anxiety  LORazepam     Tablet 2 milliGRAM(s) Oral every 2 hours PRN CIWA score increase by 2 points and current CIWA score GREATER THAN 9  LORazepam   Injectable 2 milliGRAM(s) IntraMuscular Once PRN Agitation  nicotine  Polacrilex Gum 2 milliGRAM(s) Oral every 2 hours PRN breakthrough cravings  oxyCODONE    IR 5 milliGRAM(s) Oral every 6 hours PRN Moderate Pain (4 - 6)  
MEDICATIONS  (PRN):  albuterol    90 MICROgram(s) HFA Inhaler 2 Puff(s) Inhalation every 6 hours PRN Shortness of Breath and/or Wheezing  hydrOXYzine hydrochloride 25 milliGRAM(s) Oral every 6 hours PRN Anxiety  LORazepam     Tablet 0.5 milliGRAM(s) Oral every 8 hours PRN anxiety  LORazepam   Injectable 2 milliGRAM(s) IntraMuscular Once PRN Agitation  nicotine  Polacrilex Gum 2 milliGRAM(s) Oral every 2 hours PRN breakthrough cravings  oxyCODONE    IR 5 milliGRAM(s) Oral every 6 hours PRN Moderate Pain (4 - 6)  
MEDICATIONS  (PRN):  albuterol    90 MICROgram(s) HFA Inhaler 2 Puff(s) Inhalation every 6 hours PRN Shortness of Breath and/or Wheezing  hydrOXYzine hydrochloride 25 milliGRAM(s) Oral every 6 hours PRN Anxiety  LORazepam     Tablet 0.5 milliGRAM(s) Oral every 8 hours PRN anxiety  LORazepam   Injectable 2 milliGRAM(s) IntraMuscular Once PRN Agitation  nicotine  Polacrilex Gum 2 milliGRAM(s) Oral every 2 hours PRN breakthrough cravings  oxyCODONE    IR 5 milliGRAM(s) Oral every 6 hours PRN Moderate Pain (4 - 6)

## 2023-05-23 NOTE — BH DISCHARGE NOTE NURSING/SOCIAL WORK/PSYCH REHAB - NSDCPRRECOMMEND_PSY_ALL_CORE
Psychiatric rehabilitation team recommends patient to continue treatment in outpatient services for coping skills development and symptom management.

## 2023-05-23 NOTE — BH DISCHARGE NOTE NURSING/SOCIAL WORK/PSYCH REHAB - NSCDUDCCRISIS_PSY_A_CORE
[facility] Well  [phone] [facility]-WELL [phone])  Text "WELL" to [phone]  Website: [email or web address] 1 [phone]) Website: [email or web address].org/.  [facility] Crisis [facility] for Children, Adults and Families  [address] [place] [address]  Mobile Crisis Hotline – (633) 935-6760/.[place] Suicide Prevention Lifeline 4 (839) 037-6849/.  [facility]  [phone]/.  [facility] Response Crisis Hotline  (429) 538-4281  24 hour telephone crisis intervention and suicide prevention hotline concerned with all mental health issues/.  [facility]  [address] [place] [address] [phone]   Hours:  Monday through Friday from 9 AM to 3 PM/988 Suicide and Crisis Lifeline

## 2023-05-23 NOTE — BH PSYCHOLOGY - GROUP THERAPY NOTE - NSPSYCHOLGRPGENGOAL_PSY_A_CORE
assessment/decrease symptoms/improve level of independent functioning/improve social/vocational/coping skills/psychoeducation/treatment compliance
assessment/decrease symptoms/improve level of independent functioning/improve social/vocational/coping skills/prevent relapse/psychoeducation/treatment compliance

## 2023-05-23 NOTE — BH DISCHARGE NOTE NURSING/SOCIAL WORK/PSYCH REHAB - PATIENT PORTAL LINK FT
You can access the FollowMyHealth Patient Portal offered by [facility] by registering at the following website: [email or web address]. By joining [facility]’s FollowMyHealth portal, you will also be able to view your health information using other applications (apps) compatible with our system.

## 2023-05-23 NOTE — BH INPATIENT PSYCHIATRY PROGRESS NOTE - NSBHTIMEACTIVITIESPERFORMED_PSY_A_CORE
pt interview, team meeting, documentation 
pt interview, team meeting, collateral from outpt psychiatrist, documentation 
day of discharge planning, pt interview, team meeting, documentation 
pt interview, phone calls to friend and psychiatrist, weekend chart review, team meeting, documentation

## 2023-05-23 NOTE — BH INPATIENT PSYCHIATRY DISCHARGE NOTE - OTHER PAST PSYCHIATRIC HISTORY (INCLUDE DETAILS REGARDING ONSET, COURSE OF ILLNESS, INPATIENT/OUTPATIENT TREATMENT)
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 3 prior psychiatric admissions - last admission in 2020 at [place]. Prior suicide attempts include driving erratically and a drug overdose. Patient has prior substance use without known withdrawal issues. She has a hx of trauma, no current legal issues. Patient has PMH bilateral salpingectomy and D&C on April 24th at [facility], along with  endometriosis, asthma. Patient was brought in by a friend as advised by outpatient psychiatrist for suicidal ideation.  Current psychiatrist is Dr. Martha Dennis. 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 3 prior psychiatric admissions - last admission in 2020 at [place]. Prior suicide attempts include driving erratically and a drug overdose. Patient has prior substance use without known withdrawal issues. She has a hx of trauma, no current legal issues. Patient has [facility] bilateral salpingectomy and D&C on April 24th at [facility], along with  endometriosis, asthma. Patient was brought in by a friend as advised by outpatient psychiatrist for suicidal ideation.  Current psychiatrist is Dr. Martha Dennis.

## 2023-05-23 NOTE — BH INPATIENT PSYCHIATRY PROGRESS NOTE - NSBHFUPINTERVALHXFT_PSY_A_CORE
No acute overnight events reported. In the last 24 hours, patient received the following PRN medications: Ativan 0.5mg x 2 and Atarax 50mg x 2. Patient in good behavorial control and compliant with medication. Per sleep log, patient slept well last night. Patient attending group therapy sessions and social with select peers.     Case discussed and reviewed with team. On exam, patient appears bright and is cooperative throughout the assessment. Upon discharge, patient immediately plans on eating pizza at [employer], a restaurant owned by her uncle. Patient is looking forward to spending time with her family and her pets. She is planning on attending her niece's high school graduation and helping her move into college this summer. Patient describes different activities she plans on enjoying this summer, including going back on her boat and taking walks. Patient states that she has secured a new A assignment, which is scheduled to start on June 1st. Patient identifies her family and friends as significant sources of emotional support and plans to seek help from them in times of distress. For now, patient plans on moving back to her ex-partner's house. She is no longer distraught over the end of the relationship and does not plan on reconciling with her partner. Patient states she will consider alternative options for housing in about a year. Denies SIIP or HIIP. Reports good sleep and appetite.     Patient informed of upcoming appt. schedule with Dr. Dennis for next Tuesday. Patient remains agreeable with plan for outpatient therapy, via telehealth, but does prefer to follow-up with a provider, in person.

## 2023-05-23 NOTE — BH INPATIENT PSYCHIATRY PROGRESS NOTE - NSBHMETABOLIC_PSY_ALL_CORE_FT
BMI:   HbA1c:   Glucose:   BP: 105/65 (05-20-23 @ 08:49) (105/65 - 105/65)  Lipid Panel: 
BMI:   HbA1c:   Glucose:   BP: --  Lipid Panel: 
BMI:   HbA1c:   Glucose:   BP: 135/70 (05-16-23 @ 16:00) (126/87 - 135/70)  Lipid Panel: 
BMI:   HbA1c:   Glucose:   BP: 107/67 (05-22-23 @ 06:43) (107/67 - 107/67)  Lipid Panel: 
BMI:   HbA1c:   Glucose:   BP: 107/67 (05-22-23 @ 06:43) (105/65 - 107/67)  Lipid Panel: 
BMI:   HbA1c:   Glucose:   BP: 105/65 (05-20-23 @ 08:49) (105/65 - 105/65)  Lipid Panel:

## 2023-05-23 NOTE — BH INPATIENT PSYCHIATRY DISCHARGE NOTE - NSDCCPCAREPLAN_GEN_ALL_CORE_FT
PRINCIPAL DISCHARGE DIAGNOSIS  Diagnosis: Major depression  Assessment and Plan of Treatment:      PRINCIPAL DISCHARGE DIAGNOSIS  Diagnosis: Major depressive disorder, recurrent episode, severe with anxious distress  Assessment and Plan of Treatment:

## 2023-05-23 NOTE — BH PSYCHOLOGY - GROUP THERAPY NOTE - NSPSYCHOLGRPGENPT_PSY_A_CORE FT
Patient attended the cognitive behavior therapy group session. Group session focused on the topic of problem solving.  Discussion revolved around the identification of problems, exploring the maximization of benefits while reducing the cost of negative consequences as well as tangible strategies to solving problems.  Group expectations and guidelines, as well as confidentiality and its limitations, were addressed. Principles of cognitive behavior therapy related to today's group topic were reviewed and discussed. Group members illustrated understanding of these concepts by giving personal examples based on their current experiences. Discussions stemmed from the group topics to the causal connection between thoughts, feelings, and behaviors.
Patient attended the psychology cognitive-behavior therapy group. The discussion was focused on the topic of Motivation.  Group expectations, guidelines, and confidentiality were reviewed.  The group began with a description of motivation and its role in changing patterns when adapting to new situations. Group members were then encouraged to identify some problems they would like to address and focus on one important change that could change the problem. Group members then learned about how to take the necessary steps towards assessing their degree of ambivalence and conduct a Cost Benefits Analysis to help reevaluate their willingness to change.  Discussion stemmed from the group's focus on the connection between thoughts, feelings and behaviors.  Group members demonstrated their understanding through active participation, discussion, and by asking clarifying questions.  Members were also provided with a handout describing the concepts and strategies discussed during group.

## 2023-05-23 NOTE — BH INPATIENT PSYCHIATRY PROGRESS NOTE - NSBHATTESTCOMMENTATTENDFT_PSY_A_CORE
Pt irritable and demanding today with related wish for additional Ativan to manage anxiety, had difficulty discussing any other topic. Remains depressed and intermittently tearful. Suggests ongoing passive SI though interview limited by preoccupation/focus on Ativan dosing. Will c/w Zoloft and Ativan (now dosed 2mg qhs with 0.5mg q8h prn anxiety). Repeat EKG for bradycardia. 
Pt remains with improved mood and continues to adamantly deny SIIP. She denies HIIP as well. She is hopeful and future-oriented (with many plans, including returning to work, taking her boat out this summer, seeing her nieces and nephews, spending time with her mother, etc). She has been in good behavioral control and tending to ADLs appropriately. She is compliant with meds. She is motivated to further engage in outpt treatment and therapy. She denies having access to a gun and [facility] Safe Act has been submitted. Given that pt no longer presents as an acute risk of harm to self (or others), she no longer requires hospitalization and will be discharged as per her 3-day letter today. Pt to c/w Zoloft and Ativan. She will f/u with Dr. Dennis and has intake with new therapist tomorrow. 
Pt continues to report improved mood and continues to deny SIIP. Pt says she is feeling more hopeful about her future, feels well-supported by family and friends, and identifies her family (mother, nephews) as highly protective. She remains engaged in unit milieu, attending groups, journaling, social with peers. She continues to report poor/fragmented sleep due to disruptions on the unit. She reports continued abd pain though is agreeable to stopping oxycodone. Pt submitted a 3-day letter. Given severity of presentation on admission (just one week ago) will monitor for ongoing sustained improvement and consider discharge tomorrow. Will c/w Zoloft and Ativan. Spoke to pt's friend Jacqueline (at pt's request). Have left a message with pt's psychiatrist Dr. Dennis, awaiting call back.

## 2023-05-23 NOTE — BH INPATIENT PSYCHIATRY PROGRESS NOTE - NSBHATTESTTYPEVISIT_PSY_A_CORE
Attending Only
FADY without on-site Attending supervision
FADY without on-site Attending supervision
Attending with Resident/Fellow/Student

## 2023-05-23 NOTE — BH INPATIENT PSYCHIATRY PROGRESS NOTE - NSBHCHARTREVIEWVS_PSY_A_CORE FT
Vital Signs Last 24 Hrs  T(C): --  T(F): --  HR: --  BP: --  BP(mean): --  RR: --  SpO2: --    Orthostatic VS  05-21-23 @ 19:48  Lying BP: --/-- HR: --  Sitting BP: 124/79 HR: 76  Standing BP: 120/70 HR: 99  Site: upper left arm  Mode: electronic  
Vital Signs Last 24 Hrs  T(C): 36.6 (05-18-23 @ 07:51), Max: 36.8 (05-17-23 @ 21:32)  T(F): 97.8 (05-18-23 @ 07:51), Max: 98.3 (05-17-23 @ 21:32)  HR: --  BP: --  BP(mean): --  RR: --  SpO2: --    Orthostatic VS  05-18-23 @ 07:51  Lying BP: --/-- HR: --  Sitting BP: 125/80 HR: 54  Standing BP: 134/79 HR: 58  Site: upper left arm  Mode: electronic  Orthostatic VS  05-17-23 @ 21:32  Lying BP: --/-- HR: --  Sitting BP: 121/61 HR: 76  Standing BP: --/-- HR: --  Site: --  Mode: --  Orthostatic VS  05-17-23 @ 08:10  Lying BP: --/-- HR: --  Sitting BP: 128/74 HR: 62  Standing BP: 108/68 HR: 60  Site: --  Mode: electronic  Orthostatic VS  05-16-23 @ 17:59  Lying BP: --/-- HR: --  Sitting BP: 133/90 HR: 100  Standing BP: 130/85 HR: 100  Site: --  Mode: --  
Vital Signs Last 24 Hrs  T(C): 36.4 (05-22-23 @ 06:43), Max: 37.2 (05-21-23 @ 19:48)  T(F): 97.6 (05-22-23 @ 06:43), Max: 98.9 (05-21-23 @ 19:48)  HR: 73 (05-22-23 @ 06:43) (73 - 73)  BP: 107/67 (05-22-23 @ 06:43) (107/67 - 107/67)  BP(mean): --  RR: --  SpO2: --    Orthostatic VS  05-21-23 @ 19:48  Lying BP: --/-- HR: --  Sitting BP: 124/79 HR: 76  Standing BP: 120/70 HR: 99  Site: upper left arm  Mode: electronic  Orthostatic VS  05-21-23 @ 06:34  Lying BP: --/-- HR: --  Sitting BP: 93/69 HR: 54  Standing BP: 100/78 HR: 79  Site: --  Mode: --  
Vital Signs Last 24 Hrs  T(C): 36.3 (05-19-23 @ 07:55), Max: 36.8 (05-18-23 @ 19:31)  T(F): 97.4 (05-19-23 @ 07:55), Max: 98.2 (05-18-23 @ 19:31)  HR: --  BP: --  BP(mean): --  RR: --  SpO2: --    Orthostatic VS  05-19-23 @ 07:55  Lying BP: --/-- HR: --  Sitting BP: 108/61 HR: 54  Standing BP: 120/70 HR: 67  Site: upper left arm  Mode: electronic  Orthostatic VS  05-18-23 @ 19:31  Lying BP: --/-- HR: --  Sitting BP: 103/83 HR: 65  Standing BP: 110/86 HR: 70  Site: --  Mode: --  Orthostatic VS  05-18-23 @ 07:51  Lying BP: --/-- HR: --  Sitting BP: 125/80 HR: 54  Standing BP: 134/79 HR: 58  Site: upper left arm  Mode: electronic  Orthostatic VS  05-17-23 @ 21:32  Lying BP: --/-- HR: --  Sitting BP: 121/61 HR: 76  Standing BP: --/-- HR: --  Site: --  Mode: --  
Vital Signs Last 24 Hrs  T(C): 36.2 (05-20-23 @ 08:49), Max: 36.9 (05-19-23 @ 20:16)  T(F): 97.1 (05-20-23 @ 08:49), Max: 98.4 (05-19-23 @ 20:16)  HR: --  BP: 105/65 (05-20-23 @ 08:49) (105/65 - 105/65)  BP(mean): 68 (05-20-23 @ 08:49) (68 - 68)  RR: --  SpO2: --    Orthostatic VS  05-19-23 @ 20:16  Lying BP: --/-- HR: --  Sitting BP: 128/84 HR: 105  Standing BP: 128/74 HR: 103  Site: --  Mode: --  Orthostatic VS  05-19-23 @ 07:55  Lying BP: --/-- HR: --  Sitting BP: 108/61 HR: 54  Standing BP: 120/70 HR: 67  Site: upper left arm  Mode: electronic  Orthostatic VS  05-18-23 @ 19:31  Lying BP: --/-- HR: --  Sitting BP: 103/83 HR: 65  Standing BP: 110/86 HR: 70  Site: --  Mode: --  
Vital Signs Last 24 Hrs  T(C): 36.7 (05-21-23 @ 06:34), Max: 36.7 (05-21-23 @ 06:34)  T(F): 98.1 (05-21-23 @ 06:34), Max: 98.1 (05-21-23 @ 06:34)  HR: --  BP: --  BP(mean): --  RR: --  SpO2: --    Orthostatic VS  05-21-23 @ 06:34  Lying BP: --/-- HR: --  Sitting BP: 93/69 HR: 54  Standing BP: 100/78 HR: 79  Site: --  Mode: --  Orthostatic VS  05-19-23 @ 20:16  Lying BP: --/-- HR: --  Sitting BP: 128/84 HR: 105  Standing BP: 128/74 HR: 103  Site: --  Mode: --

## 2023-05-23 NOTE — BH PSYCHOLOGY - GROUP THERAPY NOTE - TOKEN PULL-DIAGNOSIS
Primary Diagnosis:  Major depressive disorder, recurrent episode, severe with anxious distress [F33.2]        Problem Dx:   Cannabis use disorder, moderate, dependence [F12.20]      Post traumatic stress disorder (PTSD) [F43.10]      Major depressive disorder, recurrent episode, severe with anxious distress [F33.2]      
Primary Diagnosis:  Major depressive disorder, recurrent episode, severe with anxious distress [F33.2]        Problem Dx:   Borderline personality disorder [F60.3]      Cannabis use disorder, moderate, dependence [F12.20]      Post traumatic stress disorder (PTSD) [F43.10]      Major depressive disorder, recurrent episode, severe with anxious distress [F33.2]

## 2023-05-23 NOTE — BH PSYCHOLOGY - GROUP THERAPY NOTE - NSBHPSYCHOLPARTICIPCOMMENT_PSY_A_CORE FT
Patient arrived late to the group session. She appeared attentive to the group discussion and was observed following along with the provided worksheet. Patient left the group session to meet with her treatment provider before returning to her seat. Patient was able to engage with  and other members appropriately. 
Patient appeared attentive and interested in the group discussion.  Although the patient did not contribute spontaneously during the group session, patient was able to read from the worksheet when prompted. Patient was able to engage with the  and other members appropriately.

## 2023-05-23 NOTE — BH INPATIENT PSYCHIATRY DISCHARGE NOTE - HOSPITAL COURSE
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, with PPHx dx MDD, anxiety, PTSD, three prior hospitalizations (most recent in 2020 at [place]), hx of several prior suicide attempts (driving erratically, drug overdose, putting a gun to her head), hx of polysubstance use (including cocaine use, hx of attending rehab/detox), current cannabis and ETOH use without known withdrawal, w/ PMHx bilateral salpingectomy and D&C (for ectopic pregnancy) on 4/24/23 at [facility], brought in by friend advised by outpatient psychiatrist for suicidal ideation and putting a gun to her head in the context of many stressors (recent surgery and related infertility, break-up with boyfriend in the context of him having an affair, loss of job and financial strain), EtOH use for the past week, and non-adherence with Zoloft for the past one week.       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    Pt initially presented with symptoms of depression (depressed mood, anhedonia, hopelessness, worthless, low energy, poor sleep and appetite) with worsening suicidal ideations and increasingly suicidal behavior (reported placing a friend's gun to her head two days before admission and walking in traffic), and increased anxiety in the context of many stressors (recent surgery and related infertility, break-up with boyfriend in the context of him having an affair, loss of job and financial strain), EtOH use for the past week, and non-adherence with Zoloft for the past one week.s. On admission and early in her hospital course, patient continued to endorse ongoing passive SI.    Pt was re-started on home med of Zoloft 100mg BID daily. Per the recommendation of her outpatient psychiatrist, patient was switched from Xanax to Ativan for management of her anxiety. Patient was started on Ativan 0.5mg q8h prn and Ativan 2mg qhs (previous home med of Xanax 2mg qhs) with good effect and tolerability. PRN Oxycodone 5mg q6h was started for post-op pain, but discontinued prior to discharge (as per recommendation from patient’s outpatient OBGYN). Patient was started on PRNs ibuprofen 400mg q6h and acetaminophen 650mg q6h for pain.    Over hospitalization, patient’s depressive symptoms improved (mood became stable, sleep and appetite improved). Pt reported improved anxiety after switching from Xanax to Ativan. Pt denied SIIP and HIIP. 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 Throughout her hospital course, patient did not exhibit any psychotic symptoms, endorse any perceptual disturbances or appear internally preoccupied.    Throughout hospitalization pt was intermittently irritable (in the context of demanding additional benzodiazepines for management of her anxiety), but was pleasant overall. Patient remained in good behavioral control. She never became agitated, aggressive, or violent. She attended group therapy sessions and was interactive in the unit milieu as well as social with peers. She tended to ADLs independently and appropriately. She exhibited good appetite and energy. Pt's family and friends were involved with treatment very supportive.    Patient submitted a three-day letter requesting discharge. Pt now appears to be at her baseline and no longer presents as an acute risk of harm to self or others (and is therefore appropriate for discharge with continued outpatient follow-up).    Pt was discharged with 30-day supply of Zoloft 100mg BID daily, Ativan 2mg qhs, PRNs Ativan 0.5mg BID and Atarax 25mg BID for anxiety, and PRNs ibuprofen 400mg q6h and acetaminophen 650mg q6h for pain.    Risk Assessment: Pt remains a high chronic risk of harm to self due to cluster b personality pathology and major depressive disorder diagnosis, hx of multiple inpatient hospitalizations, hx of several suicide attempts, hx of substance abuse, and unstable employment and housing. Acute risk factors include recent episode of depression with SI and increasing suicidal behavior in the context of acute stressors, now treated with inpatient hospitalization. Other protective factors that mitigate acute risks include pt now denied SI/I/P (as well as HI/I/P), she is now hopeful and future-oriented (with plans to spend time with family and start new work assignment), she does not have access to a gun (states gun is now with friend who lives in [place]), she is motivated to further engage in oupt pt therapy, she is actively engaged in formal outpatient psychiatric treatment and has been compliant with meds, she has supportive friends, and she is able to engage in safety planning. Pt (and family) are agreeable to contact outpt providers, call 911, or go to the nearest ED with any imminent safety concerns for self or others. Given the above, patient does not appear to constitute imminent threat to self or others and is appropriate for discharge. 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, with PPHx dx MDD, anxiety, PTSD, three prior hospitalizations (most recent in 2020 at [place]), hx of several prior suicide attempts (driving erratically, drug overdose, putting a gun to her head), hx of polysubstance use (including cocaine use, hx of attending rehab/detox), current MJ and ETOH use without known withdrawal, w/ PMHx bilateral salpingectomy and D&C (for ectopic pregnancy) on 4/24/23 at [facility], brought in by friend advised by outpatient psychiatrist for suicidal ideation and putting a gun to her head in the context of many stressors (recent surgery and related infertility, break-up with boyfriend in the context of him having an affair, loss of job and financial strain), EtOH use for the past week, and non-adherence with Zoloft for the past one week.    Pt initially presented with symptoms of depression (depressed mood, anhedonia, hopelessness, worthless, low energy, poor sleep and appetite) with worsening suicidal ideations and increasingly suicidal behavior (reported placing a friend's gun to her head two days before admission and walking in traffic), and increased anxiety in the context of many stressors (as above).    Pt was re-started on home med of Zoloft 100mg BID daily. Per the recommendation of her outpatient psychiatrist, patient was switched from Xanax to Ativan for management of her anxiety. Patient was started on Ativan 0.5mg q8h prn and Ativan 2mg qhs (previous home med of Xanax 1mg BID) with good effect and tolerability. Pt used prns of Oxycodone 5mg q6h for continued post-op pain (as per pt's obgyn), which was discontinued prior to discharge.     Over hospitalization, patient’s depressive symptoms improved rapidly (mood became brighter, sleep and appetite improved). 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 Pt reported improved anxiety after switching from Xanax to Ativan. Pt consistently denied HIIP.     Throughout hospitalization pt was intermittently irritable and demanding (with regards to desired medication) though she was overall in good behavioral control. She never became agitated, aggressive, or violent. She attended group therapy sessions and was interactive in the unit milieu as well as social with peers. She tended to ADLs independently and appropriately. She exhibited good appetite and energy. Pt's family and friends were involved with treatment very supportive.    Patient submitted a three-day letter requesting discharge. Pt now appears to be at her baseline and no longer presents as an acute risk of harm to self or others (and is therefore appropriate for discharge with continued outpatient follow-up).    Pt denied having access to a gun at home. She had reported that gun she had access to prior to hospitalization was her friend's gun who lives in [place] (and that he had returned to [place] at present). [employer] was submitted Reference Number: [id number].    Pt was discharged with 30-day supply of Zoloft 100mg BID daily and Atarax 25mg BID prn anxiety. She was discharged with a 14-day supply of Ativan 2mg qhs and Ativan 0.5mg BID.    Risk Assessment: Pt remains a high chronic risk of harm to self due to cluster b personality pathology and major depressive disorder diagnosis, hx of multiple inpatient hospitalizations, hx of several suicide attempts, hx of substance abuse, and unstable employment and housing. Acute risk factors include recent episode of depression with SI in the context of acute stressors, now treated with inpatient hospitalization. Other protective factors that mitigate acute risks include pt now denies SI/I/P (as well as HI/I/P), she is now hopeful and future-oriented (with plans to spend time with family and start new work assignment), she does not have access to a gun (states gun is now with friend who lives in [place], [place]), she is motivated to further engage in oupt pt therapy, she is actively engaged in formal outpatient psychiatric treatment and has been compliant with meds, she has supportive friends, and she is able to engage in safety planning. Pt (and family) are agreeable to contact outpt providers, call 911, or go to the nearest ED with any imminent safety concerns for self or others. Given the above, patient does not appear to constitute imminent threat to self or others and is appropriate for discharge.

## 2023-05-23 NOTE — BH INPATIENT PSYCHIATRY PROGRESS NOTE - CURRENT MEDICATION
MEDICATIONS  (STANDING):  LORazepam     Tablet 2 milliGRAM(s) Oral at bedtime  naproxen 500 milliGRAM(s) Oral once  nicotine -  14 mG/24Hr(s) Patch 1 Patch Transdermal daily  pantoprazole    Tablet 40 milliGRAM(s) Oral before breakfast  sertraline 100 milliGRAM(s) Oral two times a day    MEDICATIONS  (PRN):  acetaminophen     Tablet .. 650 milliGRAM(s) Oral every 6 hours PRN Mild Pain (1 - 3), Moderate Pain (4 - 6)  albuterol    90 MICROgram(s) HFA Inhaler 2 Puff(s) Inhalation every 6 hours PRN Shortness of Breath and/or Wheezing  hydrOXYzine hydrochloride 25 milliGRAM(s) Oral every 6 hours PRN Anxiety  ibuprofen  Tablet. 400 milliGRAM(s) Oral every 6 hours PRN Mild Pain (1 - 3), Moderate Pain (4 - 6)  LORazepam     Tablet 0.5 milliGRAM(s) Oral every 8 hours PRN anxiety  LORazepam   Injectable 2 milliGRAM(s) IntraMuscular Once PRN Agitation  nicotine  Polacrilex Gum 2 milliGRAM(s) Oral every 2 hours PRN breakthrough cravings  
MEDICATIONS  (STANDING):  LORazepam     Tablet 2 milliGRAM(s) Oral at bedtime  nicotine -  14 mG/24Hr(s) Patch 1 Patch Transdermal daily  sertraline 100 milliGRAM(s) Oral two times a day    MEDICATIONS  (PRN):  albuterol    90 MICROgram(s) HFA Inhaler 2 Puff(s) Inhalation every 6 hours PRN Shortness of Breath and/or Wheezing  hydrOXYzine hydrochloride 25 milliGRAM(s) Oral every 6 hours PRN Anxiety  LORazepam     Tablet 0.5 milliGRAM(s) Oral every 8 hours PRN anxiety  LORazepam     Tablet 2 milliGRAM(s) Oral every 2 hours PRN CIWA score increase by 2 points and current CIWA score GREATER THAN 9  LORazepam   Injectable 2 milliGRAM(s) IntraMuscular Once PRN Agitation  nicotine  Polacrilex Gum 2 milliGRAM(s) Oral every 2 hours PRN breakthrough cravings  oxyCODONE    IR 5 milliGRAM(s) Oral every 6 hours PRN Moderate Pain (4 - 6)  
MEDICATIONS  (STANDING):  LORazepam     Tablet 2 milliGRAM(s) Oral at bedtime  naproxen 500 milliGRAM(s) Oral once  nicotine -  14 mG/24Hr(s) Patch 1 Patch Transdermal daily  pantoprazole    Tablet 40 milliGRAM(s) Oral before breakfast  sertraline 100 milliGRAM(s) Oral two times a day    MEDICATIONS  (PRN):  acetaminophen     Tablet .. 650 milliGRAM(s) Oral every 6 hours PRN Mild Pain (1 - 3), Moderate Pain (4 - 6)  albuterol    90 MICROgram(s) HFA Inhaler 2 Puff(s) Inhalation every 6 hours PRN Shortness of Breath and/or Wheezing  hydrOXYzine hydrochloride 25 milliGRAM(s) Oral every 6 hours PRN Anxiety  ibuprofen  Tablet. 400 milliGRAM(s) Oral every 6 hours PRN Mild Pain (1 - 3), Moderate Pain (4 - 6)  LORazepam     Tablet 0.5 milliGRAM(s) Oral every 8 hours PRN anxiety  LORazepam   Injectable 2 milliGRAM(s) IntraMuscular Once PRN Agitation  nicotine  Polacrilex Gum 2 milliGRAM(s) Oral every 2 hours PRN breakthrough cravings  
MEDICATIONS  (STANDING):  LORazepam     Tablet 2 milliGRAM(s) Oral at bedtime  nicotine -  14 mG/24Hr(s) Patch 1 Patch Transdermal daily  pantoprazole    Tablet 40 milliGRAM(s) Oral before breakfast  sertraline 100 milliGRAM(s) Oral two times a day    MEDICATIONS  (PRN):  albuterol    90 MICROgram(s) HFA Inhaler 2 Puff(s) Inhalation every 6 hours PRN Shortness of Breath and/or Wheezing  hydrOXYzine hydrochloride 25 milliGRAM(s) Oral every 6 hours PRN Anxiety  LORazepam     Tablet 0.5 milliGRAM(s) Oral every 8 hours PRN anxiety  LORazepam     Tablet 2 milliGRAM(s) Oral every 2 hours PRN CIWA score increase by 2 points and current CIWA score GREATER THAN 9  LORazepam   Injectable 2 milliGRAM(s) IntraMuscular Once PRN Agitation  nicotine  Polacrilex Gum 2 milliGRAM(s) Oral every 2 hours PRN breakthrough cravings  oxyCODONE    IR 5 milliGRAM(s) Oral every 6 hours PRN Moderate Pain (4 - 6)  
MEDICATIONS  (STANDING):  LORazepam     Tablet 2 milliGRAM(s) Oral at bedtime  nicotine -  14 mG/24Hr(s) Patch 1 Patch Transdermal daily  pantoprazole    Tablet 40 milliGRAM(s) Oral before breakfast  sertraline 100 milliGRAM(s) Oral two times a day    MEDICATIONS  (PRN):  albuterol    90 MICROgram(s) HFA Inhaler 2 Puff(s) Inhalation every 6 hours PRN Shortness of Breath and/or Wheezing  hydrOXYzine hydrochloride 25 milliGRAM(s) Oral every 6 hours PRN Anxiety  LORazepam     Tablet 0.5 milliGRAM(s) Oral every 8 hours PRN anxiety  LORazepam   Injectable 2 milliGRAM(s) IntraMuscular Once PRN Agitation  nicotine  Polacrilex Gum 2 milliGRAM(s) Oral every 2 hours PRN breakthrough cravings  oxyCODONE    IR 5 milliGRAM(s) Oral every 6 hours PRN Moderate Pain (4 - 6)  
MEDICATIONS  (STANDING):  LORazepam     Tablet 2 milliGRAM(s) Oral at bedtime  naproxen 500 milliGRAM(s) Oral once  nicotine -  14 mG/24Hr(s) Patch 1 Patch Transdermal daily  pantoprazole    Tablet 40 milliGRAM(s) Oral before breakfast  sertraline 100 milliGRAM(s) Oral two times a day    MEDICATIONS  (PRN):  albuterol    90 MICROgram(s) HFA Inhaler 2 Puff(s) Inhalation every 6 hours PRN Shortness of Breath and/or Wheezing  hydrOXYzine hydrochloride 25 milliGRAM(s) Oral every 6 hours PRN Anxiety  LORazepam     Tablet 0.5 milliGRAM(s) Oral every 8 hours PRN anxiety  LORazepam   Injectable 2 milliGRAM(s) IntraMuscular Once PRN Agitation  nicotine  Polacrilex Gum 2 milliGRAM(s) Oral every 2 hours PRN breakthrough cravings  oxyCODONE    IR 5 milliGRAM(s) Oral every 6 hours PRN Moderate Pain (4 - 6)

## 2023-05-23 NOTE — BH DISCHARGE NOTE NURSING/SOCIAL WORK/PSYCH REHAB - NSDCADDINFO1FT_PSY_ALL_CORE
You will be seeing Tena Oliva via Telehealth. Please check email for link. Additionally, please check email and make sure all required forms are filled out. You will be seeing Tena Oliva via Telehealth. Please check email for link. Additionally, please check email and make sure all required forms are filled out ASAP. Please call location and confirm paperwork has been received after submitting.

## 2023-05-23 NOTE — BH INPATIENT PSYCHIATRY DISCHARGE NOTE - REASON FOR ADMISSION
worsening suicidal ideations  suicide attempt with worsening mood related to multiple acute stressors

## 2023-05-23 NOTE — BH DISCHARGE NOTE NURSING/SOCIAL WORK/PSYCH REHAB - NSDCPRGOAL_PSY_ALL_CORE
Writer met with patient to complete her safety plan and discuss her progress throughout her inpatient stay. Patient was receptive and cooperative to engage with writer and complete safety plan. Patient identified warning signs, coping skills, and protective factors. Patient reported that she has been struggling to cope with grief from miscarriage. Patient was receptive and expressed appreciation towards writer’s validations. Patient reported that she benefitted from unit programming—group therapy—especially music and art therapy. Patient shared that she is looking forward to continuing art therapy after discharge. Throughout her stay, patient attended 60% of psychiatric rehabilitation groups. Patient was noted to have been engaging well in expressive modalities. Patient denied SI, HI, AH, and VH. Patient will benefit from continuing treatment in outpatient programs/services for coping skills development and symptom management.

## 2023-05-23 NOTE — BH INPATIENT PSYCHIATRY DISCHARGE NOTE - NSSUICRSKFACTOR_PSY_ALL_CORE
Current and Past Psychiatric Diagnoses/Activating Events/Stressors Current and Past Psychiatric Diagnoses/Presenting Symptoms/Activating Events/Stressors

## 2023-05-23 NOTE — BH INPATIENT PSYCHIATRY PROGRESS NOTE - NSBHASSESSSUMMFT_PSY_ALL_CORE
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, with PPHx dx MDD, anxiety, PTSD, three prior hospitalizations (most recent in 2020 at [place]), hx of several prior suicide attempts (driving erratically, drug overdose, putting a gun to her head), hx of polysubstance use (including cocaine use, hx of attending rehab/detox), current MJ and ETOH use without known withdrawal, w/ PMHx bilateral salpingectomy and D&C (for ectopic pregnancy) on 4/24/23 at [facility], brought in by friend advised by outpatient psychiatrist for suicidal ideation and putting a gun to her head in the context of many stressors (recent surgery and related infertility, break-up with boyfriend in the context of him having an affair, loss of job and financial strain), EtOH use for the past week, and non-adherence with Zoloft for the past one week.    Patient reports stable mood, improved sleep, and is now more hopeful. Patient is very future-oriented and feels well-supported by her family and friends.  Patient denies SIIP and is able to multiple identify protective factors (her family, pets). Plan for discharge today.    Plan:  1. Will c/w home med Zoloft 100mg BID  2. C/w Ativan 2mg qhs + Ativan 0.5mg q8h prn anxiety (switched from home med Xanax 1mg BID, per recommendation of outpatient psychiatrist, Dr. Dennis)  3. Have discontinued symptom-triggered CIWA protocol (no evidence of EtOH withdrawal)  4. d/c home med oxycodone IR 5mg q6h prn pain; start Tylenol 625mg/ibuprofen 400mg q6h, prn for pain   5. Atarax 25mg prn anxiety, Ativan 2mg IM prn severe agitation

## 2023-05-23 NOTE — BH INPATIENT PSYCHIATRY PROGRESS NOTE - NSTXDCOTHRINTERMD_PSY_ALL_CORE
Will continue management and encourage patient to use coping skills to manage anxiety

## 2023-05-23 NOTE — BH INPATIENT PSYCHIATRY PROGRESS NOTE - NSICDXBHSECONDARYDX_PSY_ALL_CORE
Post traumatic stress disorder (PTSD)   F43.10  Cannabis use disorder, moderate, dependence   F12.20  Borderline personality disorder   F60.3  
Post traumatic stress disorder (PTSD)   F43.10  Cannabis use disorder, moderate, dependence   F12.20  Borderline personality disorder   F60.3  
Post traumatic stress disorder (PTSD)   F43.10  Cannabis use disorder, moderate, dependence   F12.20  
Post traumatic stress disorder (PTSD)   F43.10  Cannabis use disorder, moderate, dependence   F12.20  Borderline personality disorder   F60.3  
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
## 2023-05-23 NOTE — BH INPATIENT PSYCHIATRY PROGRESS NOTE - NSBHATTESTBILLING_PSY_A_CORE
95774-Tnybigjabj OBS or IP - low complexity OR 25-34 mins
94348-Sywttyrdtd OBS or IP - moderate complexity OR 35-49 mins
03183-Kxkvidqray OBS or IP - low complexity OR 25-34 mins
12669-Ahjtigpjiv OBS or IP - moderate complexity OR 35-49 mins
13931-Dsgcpuqzxv OBS or IP - high complexity OR 50-79 mins
91166-Yublkuhsod OBS or IP - moderate complexity OR 35-49 mins

## 2023-05-23 NOTE — BH INPATIENT PSYCHIATRY DISCHARGE NOTE - NSDCMRMEDTOKEN_GEN_ALL_CORE_FT
acetaminophen 325 mg oral tablet: 2 tab(s) orally every 6 hours As needed Mild Pain (1 - 3), Moderate Pain (4 - 6)  albuterol 90 mcg/inh inhalation aerosol: 2 puff(s) inhaled every 6 hours As needed Shortness of Breath and/or Wheezing  Ativan 0.5 mg oral tablet: 1 tab(s) orally 2 times a day as needed for  anxiety MDD: 3mg  Ativan 2 mg oral tablet: 1 tab(s) orally once a day (at bedtime) MDD: 3mg  hydrOXYzine hydrochloride 25 mg oral tablet: 1 tab(s) orally 2 times a day as needed for  anxiety  ibuprofen 400 mg oral tablet: 1 tab(s) orally every 6 hours As needed Mild Pain (1 - 3), Moderate Pain (4 - 6)  Nicoderm C-Q Clear 14 mg/24 hr transdermal film, extended release: 1 patch transdermal once a day transdermal  Nicorette 4 mg oral transmucosal gum: 1 gum oral transmucosal every 3 hours as needed for  nicotine craving  Protonix 40 mg oral delayed release tablet: 1 tab(s) orally once a day (before a meal)  Zoloft 100 mg oral tablet: 1 tab(s) orally 2 times a day

## 2023-05-23 NOTE — BH INPATIENT PSYCHIATRY DISCHARGE NOTE - ATTENDING DISCHARGE PHYSICAL EXAMINATION:
Discharge MSE: 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 with good grooming and hygiene. She is calm and cooperative, with good eye contact. No psychomotor abnormalities noted. Speech is normal in rate and volume, spontaneous. Stated mood is "better." Affect is euthymic, full. TP is linear. TC: denies SIIP, feeling more hopeful, no evidence of delusions. No perceptual disturbances noted. Insight is fair. Judgement is fair. Impulse control is fair, intact at this time.

## 2023-05-23 NOTE — BH INPATIENT PSYCHIATRY PROGRESS NOTE - MSE UNSTRUCTURED FT
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 with good grooming and hygiene. She is calm and cooperative, with good eye contact. No psychomotor abnormalities noted. Speech is normal in rate and volume, spontaneous. Stated mood is "better." Affect is euthymic, full. TP is linear. TC: denies SIIP, feeling more hopeful, no evidence of delusions. No perceptual disturbances noted. Insight is fair. Judgement is fair. Impulse control is fair, intact at this time.

## 2023-05-23 NOTE — BH DISCHARGE NOTE NURSING/SOCIAL WORK/PSYCH REHAB - NSBHDCAGENCY1FT_PSY_A_CORE
[facility]  Tena Oliva, [facility]

## 2023-05-23 NOTE — BH DISCHARGE NOTE NURSING/SOCIAL WORK/PSYCH REHAB - DISCHARGE INSTRUCTIONS AFTERCARE APPOINTMENTS
In order to check the location, date, or time of your aftercare appointment, please refer to your Discharge Instructions Document given to you upon leaving the hospital.  If you have lost the instructions please call [phone]

## 2023-05-23 NOTE — BH INPATIENT PSYCHIATRY PROGRESS NOTE - NSICDXBHPRIMARYDX_PSY_ALL_CORE
Major depressive disorder, recurrent episode, severe with anxious distress   F33.2  

## 2023-05-23 NOTE — BH INPATIENT PSYCHIATRY DISCHARGE NOTE - NSBHDCRISKMITIGATE_PSY_ALL_CORE
Family/Other social support involvement/Medications targeting suicidality/non-suicidal self injurious behavior Safety planning/Reduction in access to lethal methods (pills, firearms, etc)/Family/Other social support involvement

## 2023-05-23 NOTE — BH INPATIENT PSYCHIATRY PROGRESS NOTE - NSTXDCOTHRGOAL_PSY_ALL_CORE
Patient will decrease acuity of symptoms over the next several days

## 2023-05-25 NOTE — SOCIAL WORK POST DISCHARGE FOLLOW UP NOTE - NSBHSWFOLLOWUP_PSY_ALL_CORE_FT
SW called patient ([phone]) to explore patient's reasoning for missing therapy appointment. Patient was unable to get paperwork in on time, fell asleep, and when she called the clinic, they had given away her appointment slot. SW will assist patient in finding new therapist. None

## 2023-08-10 PROBLEM — F32.9 MAJOR DEPRESSIVE DISORDER, SINGLE EPISODE, UNSPECIFIED: Chronic | Status: ACTIVE | Noted: 2023-05-16

## 2023-08-10 PROBLEM — Z87.42 PERSONAL HISTORY OF OTHER DISEASES OF THE FEMALE GENITAL TRACT: Chronic | Status: ACTIVE | Noted: 2023-05-16

## 2023-08-10 PROBLEM — J45.909 UNSPECIFIED ASTHMA, UNCOMPLICATED: Chronic | Status: ACTIVE | Noted: 2023-05-16

## 2023-08-10 PROBLEM — Z86.59 PERSONAL HISTORY OF OTHER MENTAL AND BEHAVIORAL DISORDERS: Chronic | Status: ACTIVE | Noted: 2023-05-16

## 2023-08-18 ENCOUNTER — APPOINTMENT (OUTPATIENT)
Dept: CARDIOLOGY | Facility: CLINIC | Age: 43
End: 2023-08-18
